# Patient Record
Sex: FEMALE | Race: WHITE | NOT HISPANIC OR LATINO | Employment: PART TIME | URBAN - METROPOLITAN AREA
[De-identification: names, ages, dates, MRNs, and addresses within clinical notes are randomized per-mention and may not be internally consistent; named-entity substitution may affect disease eponyms.]

---

## 2023-02-23 LAB
ANION GAP IN SER/PLAS: 11 MMOL/L (ref 10–20)
CALCIUM (MG/DL) IN SER/PLAS: 9 MG/DL (ref 8.6–10.3)
CARBON DIOXIDE, TOTAL (MMOL/L) IN SER/PLAS: 32 MMOL/L (ref 21–32)
CHLORIDE (MMOL/L) IN SER/PLAS: 100 MMOL/L (ref 98–107)
CREATININE (MG/DL) IN SER/PLAS: 1.35 MG/DL (ref 0.5–1.05)
GFR FEMALE: 43 ML/MIN/1.73M2
GLUCOSE (MG/DL) IN SER/PLAS: 100 MG/DL (ref 74–99)
POTASSIUM (MMOL/L) IN SER/PLAS: 3.5 MMOL/L (ref 3.5–5.3)
SODIUM (MMOL/L) IN SER/PLAS: 139 MMOL/L (ref 136–145)
UREA NITROGEN (MG/DL) IN SER/PLAS: 24 MG/DL (ref 6–23)

## 2023-02-24 LAB
ESTIMATED AVERAGE GLUCOSE FOR HBA1C: 131 MG/DL
HEMOGLOBIN A1C/HEMOGLOBIN TOTAL IN BLOOD: 6.2 %

## 2023-04-27 ENCOUNTER — APPOINTMENT (OUTPATIENT)
Dept: PRIMARY CARE | Facility: CLINIC | Age: 68
End: 2023-04-27
Payer: MEDICARE

## 2023-05-30 DIAGNOSIS — I10 PRIMARY HYPERTENSION: ICD-10-CM

## 2023-05-30 DIAGNOSIS — E11.9 TYPE 2 DIABETES MELLITUS WITHOUT COMPLICATION, UNSPECIFIED WHETHER LONG TERM INSULIN USE (MULTI): ICD-10-CM

## 2023-05-30 RX ORDER — ATORVASTATIN CALCIUM 40 MG/1
40 TABLET, FILM COATED ORAL DAILY
COMMUNITY
End: 2023-05-30 | Stop reason: SDUPTHER

## 2023-05-30 RX ORDER — LISINOPRIL AND HYDROCHLOROTHIAZIDE 12.5; 2 MG/1; MG/1
2 TABLET ORAL DAILY
COMMUNITY
End: 2023-05-30 | Stop reason: SDUPTHER

## 2023-06-01 RX ORDER — ATORVASTATIN CALCIUM 40 MG/1
40 TABLET, FILM COATED ORAL DAILY
Qty: 30 TABLET | Refills: 0 | Status: SHIPPED | OUTPATIENT
Start: 2023-06-01 | End: 2024-01-12

## 2023-06-01 RX ORDER — LISINOPRIL AND HYDROCHLOROTHIAZIDE 12.5; 2 MG/1; MG/1
2 TABLET ORAL DAILY
Qty: 30 TABLET | Refills: 0 | Status: SHIPPED | OUTPATIENT
Start: 2023-06-01 | End: 2024-01-12

## 2023-08-31 ENCOUNTER — APPOINTMENT (OUTPATIENT)
Dept: PRIMARY CARE | Facility: CLINIC | Age: 68
End: 2023-08-31
Payer: MEDICARE

## 2023-09-14 PROBLEM — J44.9 CHRONIC OBSTRUCTIVE PULMONARY DISEASE (MULTI): Status: ACTIVE | Noted: 2023-09-14

## 2023-09-14 PROBLEM — I10 ESSENTIAL HYPERTENSION: Status: ACTIVE | Noted: 2023-09-14

## 2023-09-14 PROBLEM — G47.33 OBSTRUCTIVE SLEEP APNEA SYNDROME: Status: ACTIVE | Noted: 2023-09-14

## 2023-09-14 PROBLEM — I10 HYPERTENSION, BENIGN: Status: ACTIVE | Noted: 2023-09-14

## 2023-09-14 PROBLEM — I82.90 VENOUS THROMBOSIS: Status: ACTIVE | Noted: 2023-09-14

## 2023-09-14 PROBLEM — D75.1 SECONDARY POLYCYTHEMIA: Status: ACTIVE | Noted: 2023-09-14

## 2023-09-14 PROBLEM — Z85.41 HX OF CERVICAL CANCER: Status: ACTIVE | Noted: 2023-09-14

## 2023-09-14 PROBLEM — E55.9 VITAMIN D DEFICIENCY: Status: ACTIVE | Noted: 2023-09-14

## 2023-09-14 PROBLEM — I50.30: Status: ACTIVE | Noted: 2023-09-14

## 2023-09-14 PROBLEM — J43.9 PULMONARY EMPHYSEMA (MULTI): Status: ACTIVE | Noted: 2023-09-14

## 2023-09-14 PROBLEM — N95.1 MENOPAUSAL STATE: Status: ACTIVE | Noted: 2023-09-14

## 2023-09-14 PROBLEM — I50.9 HEART FAILURE (MULTI): Status: ACTIVE | Noted: 2023-09-14

## 2023-09-14 PROBLEM — F17.200 TOBACCO DEPENDENCE SYNDROME: Status: ACTIVE | Noted: 2023-09-14

## 2023-09-14 PROBLEM — E87.6 HYPOKALEMIA: Status: ACTIVE | Noted: 2023-09-14

## 2023-09-14 PROBLEM — F17.200 CURRENT SMOKER: Status: ACTIVE | Noted: 2023-09-14

## 2023-09-14 PROBLEM — E11.9 DIABETES MELLITUS, TYPE II (MULTI): Status: ACTIVE | Noted: 2023-09-14

## 2023-09-14 PROBLEM — J96.11 CHRONIC RESPIRATORY FAILURE WITH HYPOXIA (MULTI): Status: ACTIVE | Noted: 2023-09-14

## 2023-09-14 PROBLEM — R06.89 IMPAIRED GAS EXCHANGE: Status: ACTIVE | Noted: 2023-09-14

## 2023-09-14 PROBLEM — R18.8 ASCITES: Status: ACTIVE | Noted: 2023-09-14

## 2023-09-14 PROBLEM — E78.5 HYPERLIPIDEMIA, UNSPECIFIED: Status: ACTIVE | Noted: 2023-09-14

## 2023-09-14 PROBLEM — E87.8 FLUID VOLUME DISORDER: Status: ACTIVE | Noted: 2023-09-14

## 2023-09-14 RX ORDER — MICONAZOLE NITRATE 2 %
CREAM (GRAM) TOPICAL
COMMUNITY
End: 2024-03-12 | Stop reason: ALTCHOICE

## 2023-09-14 RX ORDER — ALBUTEROL SULFATE 90 UG/1
AEROSOL, METERED RESPIRATORY (INHALATION)
COMMUNITY

## 2023-09-14 RX ORDER — FLUTICASONE PROPIONATE AND SALMETEROL 250; 50 UG/1; UG/1
POWDER RESPIRATORY (INHALATION)
COMMUNITY
Start: 2019-04-16 | End: 2024-03-12 | Stop reason: ALTCHOICE

## 2023-09-14 RX ORDER — FUROSEMIDE 40 MG/1
TABLET ORAL
COMMUNITY
End: 2024-03-12 | Stop reason: ALTCHOICE

## 2023-09-14 RX ORDER — POTASSIUM CHLORIDE 750 MG/1
TABLET, EXTENDED RELEASE ORAL
COMMUNITY
Start: 2021-10-19 | End: 2024-01-12

## 2023-09-14 RX ORDER — METFORMIN HYDROCHLORIDE 500 MG/1
TABLET, EXTENDED RELEASE ORAL
COMMUNITY
Start: 2021-07-27 | End: 2024-03-12 | Stop reason: ALTCHOICE

## 2023-09-14 RX ORDER — TIOTROPIUM BROMIDE INHALATION SPRAY 3.12 UG/1
SPRAY, METERED RESPIRATORY (INHALATION)
COMMUNITY
Start: 2019-05-13 | End: 2024-03-12 | Stop reason: ALTCHOICE

## 2023-09-14 RX ORDER — ERGOCALCIFEROL 1.25 MG/1
1 CAPSULE ORAL
COMMUNITY
Start: 2022-01-27 | End: 2024-03-12 | Stop reason: ALTCHOICE

## 2023-09-14 RX ORDER — LISINOPRIL 10 MG/1
TABLET ORAL
COMMUNITY
End: 2024-03-12 | Stop reason: ALTCHOICE

## 2023-09-14 RX ORDER — ALBUTEROL SULFATE 90 UG/1
AEROSOL, METERED RESPIRATORY (INHALATION)
COMMUNITY
Start: 2018-07-16

## 2023-09-14 RX ORDER — FLUTICASONE FUROATE AND VILANTEROL 200; 25 UG/1; UG/1
POWDER RESPIRATORY (INHALATION)
COMMUNITY
End: 2024-03-12 | Stop reason: ALTCHOICE

## 2023-09-14 RX ORDER — FLUTICASONE FUROATE, UMECLIDINIUM BROMIDE AND VILANTEROL TRIFENATATE 100; 62.5; 25 UG/1; UG/1; UG/1
POWDER RESPIRATORY (INHALATION)
COMMUNITY

## 2023-09-30 ENCOUNTER — APPOINTMENT (OUTPATIENT)
Dept: LAB | Facility: LAB | Age: 68
End: 2023-09-30
Payer: MEDICARE

## 2023-09-30 PROCEDURE — 80061 LIPID PANEL: CPT

## 2023-09-30 PROCEDURE — 80048 BASIC METABOLIC PNL TOTAL CA: CPT

## 2023-09-30 PROCEDURE — 82043 UR ALBUMIN QUANTITATIVE: CPT

## 2023-09-30 PROCEDURE — 82570 ASSAY OF URINE CREATININE: CPT

## 2023-09-30 PROCEDURE — 83036 HEMOGLOBIN GLYCOSYLATED A1C: CPT

## 2023-09-30 PROCEDURE — 84443 ASSAY THYROID STIM HORMONE: CPT

## 2023-09-30 PROCEDURE — 82607 VITAMIN B-12: CPT

## 2024-01-11 DIAGNOSIS — I10 PRIMARY HYPERTENSION: ICD-10-CM

## 2024-01-11 DIAGNOSIS — E11.9 TYPE 2 DIABETES MELLITUS WITHOUT COMPLICATION, UNSPECIFIED WHETHER LONG TERM INSULIN USE (MULTI): ICD-10-CM

## 2024-01-12 RX ORDER — POTASSIUM CHLORIDE 750 MG/1
10 TABLET, EXTENDED RELEASE ORAL DAILY
Qty: 90 TABLET | Refills: 0 | Status: SHIPPED | OUTPATIENT
Start: 2024-01-12 | End: 2024-04-18

## 2024-01-12 RX ORDER — LISINOPRIL AND HYDROCHLOROTHIAZIDE 12.5; 2 MG/1; MG/1
1 TABLET ORAL DAILY
Qty: 90 TABLET | Refills: 0 | Status: SHIPPED | OUTPATIENT
Start: 2024-01-12 | End: 2024-03-12 | Stop reason: SDUPTHER

## 2024-01-12 RX ORDER — ATORVASTATIN CALCIUM 40 MG/1
40 TABLET, FILM COATED ORAL DAILY
Qty: 90 TABLET | Refills: 0 | Status: SHIPPED | OUTPATIENT
Start: 2024-01-12 | End: 2024-04-18

## 2024-01-12 NOTE — TELEPHONE ENCOUNTER
Called the pt and he stated he is taking 1 tablet a day of lisinopril-hydrochlorothiazide, med updated

## 2024-01-13 DIAGNOSIS — E11.9 TYPE 2 DIABETES MELLITUS WITHOUT COMPLICATION, WITHOUT LONG-TERM CURRENT USE OF INSULIN (MULTI): Primary | ICD-10-CM

## 2024-01-15 RX ORDER — DAPAGLIFLOZIN 10 MG/1
10 TABLET, FILM COATED ORAL DAILY
Qty: 90 TABLET | Refills: 1 | Status: SHIPPED | OUTPATIENT
Start: 2024-01-15 | End: 2024-03-12 | Stop reason: SDUPTHER

## 2024-03-04 ENCOUNTER — HOSPITAL ENCOUNTER (OUTPATIENT)
Dept: RADIOLOGY | Facility: HOSPITAL | Age: 69
Discharge: HOME | End: 2024-03-04
Payer: MEDICARE

## 2024-03-04 DIAGNOSIS — F17.200 NICOTINE DEPENDENCE, UNSPECIFIED, UNCOMPLICATED: ICD-10-CM

## 2024-03-04 PROCEDURE — 71271 CT THORAX LUNG CANCER SCR C-: CPT

## 2024-03-04 PROCEDURE — 71271 CT THORAX LUNG CANCER SCR C-: CPT | Performed by: RADIOLOGY

## 2024-03-12 ENCOUNTER — OFFICE VISIT (OUTPATIENT)
Dept: PRIMARY CARE | Facility: CLINIC | Age: 69
End: 2024-03-12
Payer: MEDICARE

## 2024-03-12 VITALS
BODY MASS INDEX: 37.5 KG/M2 | WEIGHT: 186 LBS | DIASTOLIC BLOOD PRESSURE: 78 MMHG | SYSTOLIC BLOOD PRESSURE: 126 MMHG | HEIGHT: 59 IN | OXYGEN SATURATION: 91 % | HEART RATE: 80 BPM

## 2024-03-12 DIAGNOSIS — N18.32 STAGE 3B CHRONIC KIDNEY DISEASE (MULTI): ICD-10-CM

## 2024-03-12 DIAGNOSIS — L82.0 SEBORRHEIC KERATOSES, INFLAMED: ICD-10-CM

## 2024-03-12 DIAGNOSIS — E55.9 VITAMIN D DEFICIENCY: ICD-10-CM

## 2024-03-12 DIAGNOSIS — I10 PRIMARY HYPERTENSION: ICD-10-CM

## 2024-03-12 DIAGNOSIS — E53.8 VITAMIN B12 DEFICIENCY: ICD-10-CM

## 2024-03-12 DIAGNOSIS — Z11.59 ENCOUNTER FOR HEPATITIS C SCREENING TEST FOR LOW RISK PATIENT: ICD-10-CM

## 2024-03-12 DIAGNOSIS — N18.32 TYPE 2 DIABETES MELLITUS WITH STAGE 3B CHRONIC KIDNEY DISEASE, WITHOUT LONG-TERM CURRENT USE OF INSULIN (MULTI): Primary | ICD-10-CM

## 2024-03-12 DIAGNOSIS — E11.22 TYPE 2 DIABETES MELLITUS WITH STAGE 3B CHRONIC KIDNEY DISEASE, WITHOUT LONG-TERM CURRENT USE OF INSULIN (MULTI): Primary | ICD-10-CM

## 2024-03-12 PROCEDURE — 99214 OFFICE O/P EST MOD 30 MIN: CPT

## 2024-03-12 PROCEDURE — 90750 HZV VACC RECOMBINANT IM: CPT

## 2024-03-12 PROCEDURE — 1126F AMNT PAIN NOTED NONE PRSNT: CPT

## 2024-03-12 PROCEDURE — 3074F SYST BP LT 130 MM HG: CPT

## 2024-03-12 PROCEDURE — 3078F DIAST BP <80 MM HG: CPT

## 2024-03-12 RX ORDER — LISINOPRIL AND HYDROCHLOROTHIAZIDE 12.5; 2 MG/1; MG/1
1 TABLET ORAL 2 TIMES DAILY
Qty: 180 TABLET | Refills: 1 | Status: SHIPPED | OUTPATIENT
Start: 2024-03-12

## 2024-03-12 RX ORDER — DAPAGLIFLOZIN 10 MG/1
10 TABLET, FILM COATED ORAL DAILY
Qty: 90 TABLET | Refills: 1 | Status: SHIPPED | OUTPATIENT
Start: 2024-03-12

## 2024-03-12 ASSESSMENT — ENCOUNTER SYMPTOMS
POLYPHAGIA: 0
DIZZINESS: 0
SHORTNESS OF BREATH: 1
FATIGUE: 0
LIGHT-HEADEDNESS: 0
COUGH: 1
POLYDIPSIA: 0
PALPITATIONS: 0

## 2024-03-12 ASSESSMENT — PATIENT HEALTH QUESTIONNAIRE - PHQ9
SUM OF ALL RESPONSES TO PHQ9 QUESTIONS 1 AND 2: 0
2. FEELING DOWN, DEPRESSED OR HOPELESS: NOT AT ALL
1. LITTLE INTEREST OR PLEASURE IN DOING THINGS: NOT AT ALL

## 2024-03-12 ASSESSMENT — PAIN SCALES - GENERAL: PAINLEVEL: 0-NO PAIN

## 2024-03-12 NOTE — PROGRESS NOTES
"Subjective   Patient ID: Leroy Dejesus is a 68 y.o. female who presents for Follow-up.    68yF with hx COPD, HTN, HLD, NIDDM, tobacco use, hx cervical cancer (s/p cone procedure), CHF, lipoma on right cheek.    Other providers: Dr. Youngblood (pulmonologist), Melanie Vincent CNP (previous PCP).    HTN: Controlled, on Lisinopril-HCTZ 20mg -12.5 mg BID. Patient has not been recently checking home BP's. +occasional leg edema when warm outside. Denies chest pain, heart palpitations, changes in vision, headaches, syncope.    NIDDM: last A1c was 6.6 on 23. On 10 mg Farziga with co-morbid CKD. Stopped Metformin due to diarrhea, started Farxiga due to comorbid CKD. Checks daily blood sugars, usually in 130's. Retinal exam in , plans to call and see if can find records. +Statin, +ACE. Last foot exam  2023. PNA UTD.    CKD stage 3b: started on 10 mg Farziga. Last GFR 2023 was 38.   HLD: stable on 40 mg of Atorvastatin. Last lipid 2023.     Medicare annuals done through home service, had one 2023.       Review of Systems   Constitutional:  Negative for fatigue.   Eyes:  Negative for visual disturbance.   Respiratory:  Positive for cough and shortness of breath.    Cardiovascular:  Negative for chest pain, palpitations and leg swelling.   Endocrine: Negative for polydipsia, polyphagia and polyuria.   Neurological:  Negative for dizziness, syncope and light-headedness.       Objective   /78   Pulse 80   Ht 1.499 m (4' 11\")   Wt 84.4 kg (186 lb)   SpO2 91%   BMI 37.57 kg/m²     Physical Exam  Constitutional:       General: She is not in acute distress.     Appearance: Normal appearance.   Cardiovascular:      Rate and Rhythm: Normal rate and regular rhythm.      Heart sounds: No murmur heard.  Pulmonary:      Effort: Pulmonary effort is normal.      Breath sounds: Normal breath sounds. No wheezing, rhonchi or rales.   Musculoskeletal:      Right lower le+ Edema present.      Left lower le+ " Edema present.   Skin:     General: Skin is warm and dry.      Findings: No rash.      Comments: Large SK on left lateral neck   Neurological:      Mental Status: She is alert.   Psychiatric:         Mood and Affect: Mood and affect normal.         Assessment/Plan   Diagnoses and all orders for this visit:  Type 2 diabetes mellitus with stage 3b chronic kidney disease, without long-term current use of insulin (CMS/HCC)  -     dapagliflozin propanediol (Farxiga) 10 mg; Take 1 tablet (10 mg) by mouth once daily.  -     Hemoglobin A1c; Future  -     Comprehensive metabolic panel; Future  Primary hypertension  -     lisinopriL-hydrochlorothiazide 20-12.5 mg tablet; Take 1 tablet by mouth 2 times a day.  Stage 3b chronic kidney disease (CMS/HCC)  -     Comprehensive metabolic panel; Future  Seborrheic keratoses, inflamed  Vitamin D deficiency  -     Vitamin D 25-Hydroxy,Total (for eval of Vitamin D levels); Future  Vitamin B12 deficiency  -     Vitamin B12; Future  Encounter for hepatitis C screening test for low risk patient  -     Hepatitis C Antibody; Future  Other orders  -     Zoster vaccine, recombinant, adult (SHINGRIX)  Follow-up 6 months, and schedule procedure for SK removal.

## 2024-03-13 ENCOUNTER — LAB (OUTPATIENT)
Dept: LAB | Facility: LAB | Age: 69
End: 2024-03-13
Payer: MEDICARE

## 2024-03-13 DIAGNOSIS — N18.32 TYPE 2 DIABETES MELLITUS WITH STAGE 3B CHRONIC KIDNEY DISEASE, WITHOUT LONG-TERM CURRENT USE OF INSULIN (MULTI): ICD-10-CM

## 2024-03-13 DIAGNOSIS — Z11.59 ENCOUNTER FOR HEPATITIS C SCREENING TEST FOR LOW RISK PATIENT: ICD-10-CM

## 2024-03-13 DIAGNOSIS — E55.9 VITAMIN D DEFICIENCY: ICD-10-CM

## 2024-03-13 DIAGNOSIS — N18.32 STAGE 3B CHRONIC KIDNEY DISEASE (MULTI): ICD-10-CM

## 2024-03-13 DIAGNOSIS — E11.22 TYPE 2 DIABETES MELLITUS WITH STAGE 3B CHRONIC KIDNEY DISEASE, WITHOUT LONG-TERM CURRENT USE OF INSULIN (MULTI): ICD-10-CM

## 2024-03-13 DIAGNOSIS — E53.8 VITAMIN B12 DEFICIENCY: ICD-10-CM

## 2024-03-13 LAB
25(OH)D3 SERPL-MCNC: 19 NG/ML (ref 31–100)
ALBUMIN SERPL-MCNC: 4 G/DL (ref 3.5–5)
ALP BLD-CCNC: 76 U/L (ref 35–125)
ALT SERPL-CCNC: 8 U/L (ref 5–40)
ANION GAP SERPL CALC-SCNC: 10 MMOL/L
AST SERPL-CCNC: 12 U/L (ref 5–40)
BILIRUB SERPL-MCNC: 0.3 MG/DL (ref 0.1–1.2)
BUN SERPL-MCNC: 12 MG/DL (ref 8–25)
CALCIUM SERPL-MCNC: 9.4 MG/DL (ref 8.5–10.4)
CHLORIDE SERPL-SCNC: 103 MMOL/L (ref 97–107)
CO2 SERPL-SCNC: 31 MMOL/L (ref 24–31)
CREAT SERPL-MCNC: 1.2 MG/DL (ref 0.4–1.6)
EGFRCR SERPLBLD CKD-EPI 2021: 49 ML/MIN/1.73M*2
EST. AVERAGE GLUCOSE BLD GHB EST-MCNC: 154 MG/DL
GLUCOSE SERPL-MCNC: 100 MG/DL (ref 65–99)
HBA1C MFR BLD: 7 %
HCV AB SER QL: NONREACTIVE
POTASSIUM SERPL-SCNC: 4.4 MMOL/L (ref 3.4–5.1)
PROT SERPL-MCNC: 6.4 G/DL (ref 5.9–7.9)
SODIUM SERPL-SCNC: 144 MMOL/L (ref 133–145)
VIT B12 SERPL-MCNC: 333 PG/ML (ref 211–946)

## 2024-03-13 PROCEDURE — 86803 HEPATITIS C AB TEST: CPT

## 2024-03-13 PROCEDURE — 82306 VITAMIN D 25 HYDROXY: CPT

## 2024-03-13 PROCEDURE — 36415 COLL VENOUS BLD VENIPUNCTURE: CPT

## 2024-03-13 PROCEDURE — 82607 VITAMIN B-12: CPT

## 2024-03-13 PROCEDURE — 80053 COMPREHEN METABOLIC PANEL: CPT

## 2024-03-13 PROCEDURE — 83036 HEMOGLOBIN GLYCOSYLATED A1C: CPT

## 2024-03-26 ENCOUNTER — PROCEDURE VISIT (OUTPATIENT)
Dept: PRIMARY CARE | Facility: CLINIC | Age: 69
End: 2024-03-26
Payer: MEDICARE

## 2024-03-26 VITALS
BODY MASS INDEX: 37.5 KG/M2 | HEART RATE: 69 BPM | HEIGHT: 59 IN | WEIGHT: 186 LBS | SYSTOLIC BLOOD PRESSURE: 126 MMHG | DIASTOLIC BLOOD PRESSURE: 74 MMHG | OXYGEN SATURATION: 97 %

## 2024-03-26 DIAGNOSIS — L98.9 SKIN LESION: Primary | ICD-10-CM

## 2024-03-26 DIAGNOSIS — L82.0 SEBORRHEIC KERATOSES, INFLAMED: ICD-10-CM

## 2024-03-26 PROCEDURE — 88305 TISSUE EXAM BY PATHOLOGIST: CPT | Performed by: PATHOLOGY

## 2024-03-26 PROCEDURE — 11441 EXC FACE-MM B9+MARG 0.6-1 CM: CPT

## 2024-03-26 PROCEDURE — 88305 TISSUE EXAM BY PATHOLOGIST: CPT | Mod: TC

## 2024-03-26 ASSESSMENT — PAIN SCALES - GENERAL: PAINLEVEL: 0-NO PAIN

## 2024-03-26 ASSESSMENT — ENCOUNTER SYMPTOMS
FEVER: 0
CHILLS: 0

## 2024-03-26 NOTE — PROGRESS NOTES
"Subjective   Patient ID: Leroy Dejesus is a 68 y.o. female who presents for mole removal.    68yF with hx COPD, HTN, HLD, NIDDM, tobacco use, hx cervical cancer (s/p cone procedure), CHF, lipoma on right cheek.    Other providers: Dr. Youngblood (pulmonologist), Melanie Vincent CNP (previous PCP).    Here for SK removal located left lateral neck/upper shoulder.          Review of Systems   Constitutional:  Negative for chills and fever.   Skin:  Positive for rash.       Objective   /74   Pulse 69   Ht 1.499 m (4' 11\")   Wt 84.4 kg (186 lb)   SpO2 97%   BMI 37.57 kg/m²     Physical Exam  Constitutional:       Appearance: Normal appearance.   Neck:      Comments: Large SK on left lateral neck  Cardiovascular:      Rate and Rhythm: Normal rate.   Skin:     Findings: No rash.   Neurological:      Mental Status: She is alert.   Psychiatric:         Mood and Affect: Mood and affect normal.       Patient ID: Leroy Dejesus is a 68 y.o. female.    Incisional Biopsy    Date/Time: 3/26/2024 4:35 PM    Performed by: Fabienne Styles PA-C  Authorized by: Fabienne Styles PA-C    Consent:     Consent obtained:  Verbal and written    Consent given by:  Patient    Risks, benefits, and alternatives were discussed: yes      Risks discussed:  Bleeding and infection  Universal protocol:     Procedure explained and questions answered to patient or proxy's satisfaction: yes      Site/side marked: yes      Immediately prior to procedure, a time out was called: yes      Patient identity confirmed:  Verbally with patient  Indications:     Indications:  Irritated SK  Pre-procedure details:     Skin preparation:  Povidone-iodine  Sedation:     Sedation type:  None  Anesthesia:     Anesthesia method:  Local infiltration    Local anesthetic:  Lidocaine 1% w/o epi  Post-procedure details:     Procedure completion:  Tolerated well, no immediate complications      Assessment/Plan   Diagnoses and all orders for this visit:  Skin " lesion  -     Surgical Pathology Exam  -     Incisional Biopsy  Seborrheic keratoses, inflamed  -     Incisional Biopsy

## 2024-03-29 ENCOUNTER — TELEPHONE (OUTPATIENT)
Dept: PRIMARY CARE | Facility: CLINIC | Age: 69
End: 2024-03-29
Payer: MEDICARE

## 2024-03-29 LAB
LABORATORY COMMENT REPORT: NORMAL
PATH REPORT.FINAL DX SPEC: NORMAL
PATH REPORT.GROSS SPEC: NORMAL
PATH REPORT.RELEVANT HX SPEC: NORMAL
PATH REPORT.TOTAL CANCER: NORMAL

## 2024-04-18 DIAGNOSIS — I10 PRIMARY HYPERTENSION: ICD-10-CM

## 2024-04-18 DIAGNOSIS — E11.9 TYPE 2 DIABETES MELLITUS WITHOUT COMPLICATION, UNSPECIFIED WHETHER LONG TERM INSULIN USE (MULTI): ICD-10-CM

## 2024-04-18 RX ORDER — POTASSIUM CHLORIDE 750 MG/1
10 TABLET, EXTENDED RELEASE ORAL DAILY
Qty: 90 TABLET | Refills: 1 | Status: SHIPPED | OUTPATIENT
Start: 2024-04-18

## 2024-04-18 RX ORDER — ATORVASTATIN CALCIUM 40 MG/1
40 TABLET, FILM COATED ORAL DAILY
Qty: 90 TABLET | Refills: 1 | Status: SHIPPED | OUTPATIENT
Start: 2024-04-18

## 2024-07-13 DIAGNOSIS — I10 PRIMARY HYPERTENSION: ICD-10-CM

## 2024-07-15 RX ORDER — LISINOPRIL AND HYDROCHLOROTHIAZIDE 12.5; 2 MG/1; MG/1
1 TABLET ORAL 2 TIMES DAILY
Qty: 180 TABLET | Refills: 1 | Status: SHIPPED | OUTPATIENT
Start: 2024-07-15

## 2024-09-05 ENCOUNTER — OFFICE VISIT (OUTPATIENT)
Dept: PRIMARY CARE | Facility: CLINIC | Age: 69
End: 2024-09-05
Payer: MEDICARE

## 2024-09-05 VITALS
OXYGEN SATURATION: 96 % | SYSTOLIC BLOOD PRESSURE: 138 MMHG | HEART RATE: 78 BPM | WEIGHT: 183 LBS | HEIGHT: 59 IN | DIASTOLIC BLOOD PRESSURE: 78 MMHG | BODY MASS INDEX: 36.89 KG/M2

## 2024-09-05 DIAGNOSIS — E66.01 OBESITY, MORBID (MULTI): ICD-10-CM

## 2024-09-05 DIAGNOSIS — N18.31 TYPE 2 DIABETES MELLITUS WITH STAGE 3A CHRONIC KIDNEY DISEASE, WITHOUT LONG-TERM CURRENT USE OF INSULIN (MULTI): Primary | ICD-10-CM

## 2024-09-05 DIAGNOSIS — J44.9 CHRONIC OBSTRUCTIVE PULMONARY DISEASE, UNSPECIFIED COPD TYPE (MULTI): ICD-10-CM

## 2024-09-05 DIAGNOSIS — E78.2 MIXED HYPERLIPIDEMIA: ICD-10-CM

## 2024-09-05 DIAGNOSIS — I50.30 DIASTOLIC HEART FAILURE OF UNKNOWN ETIOLOGY (MULTI): ICD-10-CM

## 2024-09-05 DIAGNOSIS — E11.22 TYPE 2 DIABETES MELLITUS WITH STAGE 3A CHRONIC KIDNEY DISEASE, WITHOUT LONG-TERM CURRENT USE OF INSULIN (MULTI): Primary | ICD-10-CM

## 2024-09-05 DIAGNOSIS — I10 PRIMARY HYPERTENSION: ICD-10-CM

## 2024-09-05 PROBLEM — E87.8 FLUID VOLUME DISORDER: Status: RESOLVED | Noted: 2023-09-14 | Resolved: 2024-09-05

## 2024-09-05 PROBLEM — F17.200 CURRENT SMOKER: Status: RESOLVED | Noted: 2023-09-14 | Resolved: 2024-09-05

## 2024-09-05 PROBLEM — E11.9 DIABETES MELLITUS, TYPE II (MULTI): Status: RESOLVED | Noted: 2023-09-14 | Resolved: 2024-09-05

## 2024-09-05 PROBLEM — J96.11 CHRONIC RESPIRATORY FAILURE WITH HYPOXIA (MULTI): Status: RESOLVED | Noted: 2023-09-14 | Resolved: 2024-09-05

## 2024-09-05 PROBLEM — R06.89 IMPAIRED GAS EXCHANGE: Status: RESOLVED | Noted: 2023-09-14 | Resolved: 2024-09-05

## 2024-09-05 PROBLEM — E10.22 TYPE 1 DIABETES MELLITUS WITH STAGE 3A CHRONIC KIDNEY DISEASE (MULTI): Status: ACTIVE | Noted: 2023-05-30

## 2024-09-05 PROBLEM — I50.9 HEART FAILURE (MULTI): Status: RESOLVED | Noted: 2023-09-14 | Resolved: 2024-09-05

## 2024-09-05 PROBLEM — R18.8 ASCITES: Status: RESOLVED | Noted: 2023-09-14 | Resolved: 2024-09-05

## 2024-09-05 PROCEDURE — 99214 OFFICE O/P EST MOD 30 MIN: CPT

## 2024-09-05 PROCEDURE — 3051F HG A1C>EQUAL 7.0%<8.0%: CPT

## 2024-09-05 PROCEDURE — 1124F ACP DISCUSS-NO DSCNMKR DOCD: CPT

## 2024-09-05 PROCEDURE — 3008F BODY MASS INDEX DOCD: CPT

## 2024-09-05 PROCEDURE — 3075F SYST BP GE 130 - 139MM HG: CPT

## 2024-09-05 PROCEDURE — 3078F DIAST BP <80 MM HG: CPT

## 2024-09-05 PROCEDURE — 1126F AMNT PAIN NOTED NONE PRSNT: CPT

## 2024-09-05 RX ORDER — LISINOPRIL AND HYDROCHLOROTHIAZIDE 12.5; 2 MG/1; MG/1
1 TABLET ORAL 2 TIMES DAILY
Qty: 180 TABLET | Refills: 1 | Status: SHIPPED | OUTPATIENT
Start: 2024-09-05 | End: 2024-09-05

## 2024-09-05 RX ORDER — POTASSIUM CHLORIDE 750 MG/1
10 TABLET, FILM COATED, EXTENDED RELEASE ORAL DAILY
Qty: 90 TABLET | Refills: 1 | Status: SHIPPED | OUTPATIENT
Start: 2024-09-05

## 2024-09-05 RX ORDER — DAPAGLIFLOZIN 10 MG/1
10 TABLET, FILM COATED ORAL DAILY
Qty: 30 TABLET | Refills: 5 | Status: SHIPPED | OUTPATIENT
Start: 2024-09-05

## 2024-09-05 RX ORDER — LISINOPRIL AND HYDROCHLOROTHIAZIDE 12.5; 2 MG/1; MG/1
2 TABLET ORAL DAILY
Qty: 180 TABLET | Refills: 1 | Status: SHIPPED | OUTPATIENT
Start: 2024-09-05

## 2024-09-05 RX ORDER — ATORVASTATIN CALCIUM 40 MG/1
40 TABLET, FILM COATED ORAL DAILY
Qty: 90 TABLET | Refills: 1 | Status: SHIPPED | OUTPATIENT
Start: 2024-09-05

## 2024-09-05 ASSESSMENT — ENCOUNTER SYMPTOMS
DIZZINESS: 0
FATIGUE: 0
SHORTNESS OF BREATH: 0
LIGHT-HEADEDNESS: 0
PALPITATIONS: 0

## 2024-09-05 ASSESSMENT — PATIENT HEALTH QUESTIONNAIRE - PHQ9
1. LITTLE INTEREST OR PLEASURE IN DOING THINGS: NOT AT ALL
SUM OF ALL RESPONSES TO PHQ9 QUESTIONS 1 AND 2: 0
2. FEELING DOWN, DEPRESSED OR HOPELESS: NOT AT ALL

## 2024-09-05 ASSESSMENT — PAIN SCALES - GENERAL: PAINLEVEL: 0-NO PAIN

## 2024-09-05 NOTE — PROGRESS NOTES
"Subjective   Patient ID: Leroy Dejesus is a 68 y.o. female who presents for Diabetes (Follow up /la).    hx COPD, HTN, HLD, NIDDM, tobacco use, hx cervical cancer (s/p cone procedure), CHF, lipoma on right cheek.    Other providers: Dr. Youngblood (pulmonologist), Melanie Vincent CNP (previous PCP).    HTN: Controlled,takes two Lisinopril-HCTZ 20mg -12.5 mg. Patient has not been recently checking home BP's. +occasional leg edema when warm outside. Denies chest pain, heart palpitations, changes in vision, headaches, syncope.    NIDDM: last A1c was 7.0 (3/13/14). On 10 mg Farziga with co-morbid CKD. Stopped Metformin due to diarrhea, started Farxiga due to comorbid CKD. Checks daily blood sugars, usually in 130's. Last foot exam UTD 9/5/24. Eye exam due, plans to schedule. GFR 49 (3/13/14). +Statin, +ACE. PNA UTD.    CKD stage 3a: started on 10 mg Farziga. Last GFR 3/2024 improved to 49.   HLD: stable on 40 mg of Atorvastatin. Last lipid 9/2023, new order placed.          Review of Systems   Constitutional:  Negative for fatigue.   Eyes:  Negative for visual disturbance.   Respiratory:  Negative for shortness of breath.    Cardiovascular:  Positive for leg swelling. Negative for chest pain and palpitations.   Neurological:  Negative for dizziness, syncope and light-headedness.       Objective   /78   Pulse 78   Ht 1.499 m (4' 11\")   Wt 83 kg (183 lb)   SpO2 96%   BMI 36.96 kg/m²     Physical Exam  Constitutional:       General: She is not in acute distress.     Appearance: Normal appearance.   HENT:      Right Ear: A middle ear effusion is present.      Left Ear: There is impacted cerumen.   Cardiovascular:      Rate and Rhythm: Normal rate and regular rhythm.      Pulses:           Dorsalis pedis pulses are 2+ on the right side and 2+ on the left side.        Posterior tibial pulses are 2+ on the right side and 2+ on the left side.      Heart sounds: No murmur heard.  Pulmonary:      Effort: Pulmonary effort " is normal.      Breath sounds: Normal breath sounds. No wheezing, rhonchi or rales.   Musculoskeletal:      Right lower le+ Pitting Edema present.      Left lower le+ Pitting Edema present.   Feet:      Right foot:      Protective Sensation: 5 sites tested.  5 sites sensed.      Skin integrity: Skin integrity normal.      Left foot:      Protective Sensation: 5 sites tested.  5 sites sensed.      Skin integrity: Skin integrity normal.   Skin:     General: Skin is warm and dry.      Findings: No rash.   Neurological:      Mental Status: She is alert.   Psychiatric:         Mood and Affect: Mood and affect normal.         Assessment/Plan   Diagnoses and all orders for this visit:  Type 2 diabetes mellitus with stage 3a chronic kidney disease, without long-term current use of insulin (Multi)  -     Basic metabolic panel; Future  -     Hemoglobin A1c; Future  -     Albumin-Creatinine Ratio, Urine Random; Future  -     dapagliflozin propanediol (Farxiga) 10 mg; Take 1 tablet (10 mg) by mouth once daily.  Primary hypertension  -     potassium chloride CR 10 mEq ER tablet; Take 1 tablet (10 mEq) by mouth once daily. Take with food.  -     lisinopriL-hydrochlorothiazide 20-12.5 mg tablet; Take 2 tablets by mouth once daily.  Mixed hyperlipidemia  -     Lipid panel; Future  -     atorvastatin (Lipitor) 40 mg tablet; Take 1 tablet (40 mg) by mouth once daily.  Obesity, morbid (Multi)  Chronic obstructive pulmonary disease, unspecified COPD type (Multi)  Diastolic heart failure of unknown etiology (Multi)  Follow-up 3 months for Medicare Physical.

## 2024-09-06 ENCOUNTER — LAB (OUTPATIENT)
Dept: LAB | Facility: LAB | Age: 69
End: 2024-09-06
Payer: MEDICARE

## 2024-09-06 DIAGNOSIS — N18.31 TYPE 2 DIABETES MELLITUS WITH STAGE 3A CHRONIC KIDNEY DISEASE, WITHOUT LONG-TERM CURRENT USE OF INSULIN (MULTI): ICD-10-CM

## 2024-09-06 DIAGNOSIS — E11.22 TYPE 2 DIABETES MELLITUS WITH STAGE 3A CHRONIC KIDNEY DISEASE, WITHOUT LONG-TERM CURRENT USE OF INSULIN (MULTI): ICD-10-CM

## 2024-09-06 DIAGNOSIS — E78.2 MIXED HYPERLIPIDEMIA: ICD-10-CM

## 2024-09-06 LAB
ANION GAP SERPL CALC-SCNC: 10 MMOL/L
BUN SERPL-MCNC: 18 MG/DL (ref 8–25)
CALCIUM SERPL-MCNC: 9.4 MG/DL (ref 8.5–10.4)
CHLORIDE SERPL-SCNC: 98 MMOL/L (ref 97–107)
CHOLEST SERPL-MCNC: 138 MG/DL (ref 133–200)
CHOLEST/HDLC SERPL: 4.3 {RATIO}
CO2 SERPL-SCNC: 33 MMOL/L (ref 24–31)
CREAT SERPL-MCNC: 1.4 MG/DL (ref 0.4–1.6)
CREAT UR-MCNC: 68.6 MG/DL
EGFRCR SERPLBLD CKD-EPI 2021: 41 ML/MIN/1.73M*2
EST. AVERAGE GLUCOSE BLD GHB EST-MCNC: 137 MG/DL
GLUCOSE SERPL-MCNC: 112 MG/DL (ref 65–99)
HBA1C MFR BLD: 6.4 %
HDLC SERPL-MCNC: 32 MG/DL
LDLC SERPL CALC-MCNC: 74 MG/DL (ref 65–130)
MICROALBUMIN UR-MCNC: 15 MG/L (ref 0–23)
MICROALBUMIN/CREAT UR: 21.9 UG/MG CREAT
POTASSIUM SERPL-SCNC: 3.9 MMOL/L (ref 3.4–5.1)
SODIUM SERPL-SCNC: 141 MMOL/L (ref 133–145)
TRIGL SERPL-MCNC: 160 MG/DL (ref 40–150)

## 2024-09-06 PROCEDURE — 83036 HEMOGLOBIN GLYCOSYLATED A1C: CPT

## 2024-09-06 PROCEDURE — 80048 BASIC METABOLIC PNL TOTAL CA: CPT

## 2024-09-06 PROCEDURE — 36415 COLL VENOUS BLD VENIPUNCTURE: CPT

## 2024-09-06 PROCEDURE — 80061 LIPID PANEL: CPT

## 2024-09-06 PROCEDURE — 82043 UR ALBUMIN QUANTITATIVE: CPT

## 2024-09-06 PROCEDURE — 82570 ASSAY OF URINE CREATININE: CPT

## 2024-09-09 ENCOUNTER — HOSPITAL ENCOUNTER (OUTPATIENT)
Dept: RADIOLOGY | Facility: HOSPITAL | Age: 69
Discharge: HOME | End: 2024-09-09
Payer: MEDICARE

## 2024-09-09 DIAGNOSIS — R91.1 SOLITARY PULMONARY NODULE: ICD-10-CM

## 2024-09-09 PROCEDURE — 71250 CT THORAX DX C-: CPT

## 2024-12-11 ENCOUNTER — OFFICE VISIT (OUTPATIENT)
Dept: PRIMARY CARE | Facility: CLINIC | Age: 69
End: 2024-12-11
Payer: MEDICARE

## 2024-12-11 VITALS
OXYGEN SATURATION: 98 % | HEIGHT: 59 IN | HEART RATE: 77 BPM | BODY MASS INDEX: 37.29 KG/M2 | SYSTOLIC BLOOD PRESSURE: 134 MMHG | WEIGHT: 185 LBS | DIASTOLIC BLOOD PRESSURE: 82 MMHG

## 2024-12-11 DIAGNOSIS — Z78.0 ASYMPTOMATIC MENOPAUSAL STATE: ICD-10-CM

## 2024-12-11 DIAGNOSIS — Z12.31 ENCOUNTER FOR SCREENING MAMMOGRAM FOR BREAST CANCER: ICD-10-CM

## 2024-12-11 DIAGNOSIS — I10 PRIMARY HYPERTENSION: ICD-10-CM

## 2024-12-11 DIAGNOSIS — J44.9 CHRONIC OBSTRUCTIVE PULMONARY DISEASE, UNSPECIFIED COPD TYPE (MULTI): ICD-10-CM

## 2024-12-11 DIAGNOSIS — E11.22 TYPE 2 DIABETES MELLITUS WITH STAGE 3B CHRONIC KIDNEY DISEASE, WITHOUT LONG-TERM CURRENT USE OF INSULIN (MULTI): ICD-10-CM

## 2024-12-11 DIAGNOSIS — F17.200 TOBACCO DEPENDENCE SYNDROME: ICD-10-CM

## 2024-12-11 DIAGNOSIS — Z00.00 ROUTINE GENERAL MEDICAL EXAMINATION AT HEALTH CARE FACILITY: Primary | ICD-10-CM

## 2024-12-11 DIAGNOSIS — E78.2 MIXED HYPERLIPIDEMIA: ICD-10-CM

## 2024-12-11 DIAGNOSIS — N18.32 TYPE 2 DIABETES MELLITUS WITH STAGE 3B CHRONIC KIDNEY DISEASE, WITHOUT LONG-TERM CURRENT USE OF INSULIN (MULTI): ICD-10-CM

## 2024-12-11 DIAGNOSIS — I10 ESSENTIAL HYPERTENSION: ICD-10-CM

## 2024-12-11 PROCEDURE — 3079F DIAST BP 80-89 MM HG: CPT

## 2024-12-11 PROCEDURE — 99215 OFFICE O/P EST HI 40 MIN: CPT

## 2024-12-11 PROCEDURE — 3048F LDL-C <100 MG/DL: CPT

## 2024-12-11 PROCEDURE — 3008F BODY MASS INDEX DOCD: CPT

## 2024-12-11 PROCEDURE — 1158F ADVNC CARE PLAN TLK DOCD: CPT

## 2024-12-11 PROCEDURE — 3044F HG A1C LEVEL LT 7.0%: CPT

## 2024-12-11 PROCEDURE — 1159F MED LIST DOCD IN RCRD: CPT

## 2024-12-11 PROCEDURE — 1123F ACP DISCUSS/DSCN MKR DOCD: CPT

## 2024-12-11 PROCEDURE — 3061F NEG MICROALBUMINURIA REV: CPT

## 2024-12-11 PROCEDURE — G0439 PPPS, SUBSEQ VISIT: HCPCS

## 2024-12-11 PROCEDURE — 99214 OFFICE O/P EST MOD 30 MIN: CPT

## 2024-12-11 PROCEDURE — 1126F AMNT PAIN NOTED NONE PRSNT: CPT

## 2024-12-11 PROCEDURE — 3075F SYST BP GE 130 - 139MM HG: CPT

## 2024-12-11 RX ORDER — DAPAGLIFLOZIN 10 MG/1
10 TABLET, FILM COATED ORAL DAILY
Qty: 30 TABLET | Refills: 5 | Status: SHIPPED | OUTPATIENT
Start: 2024-12-11

## 2024-12-11 RX ORDER — LISINOPRIL AND HYDROCHLOROTHIAZIDE 12.5; 2 MG/1; MG/1
2 TABLET ORAL DAILY
Qty: 180 TABLET | Refills: 1 | Status: SHIPPED | OUTPATIENT
Start: 2024-12-11

## 2024-12-11 RX ORDER — ATORVASTATIN CALCIUM 40 MG/1
40 TABLET, FILM COATED ORAL DAILY
Qty: 90 TABLET | Refills: 1 | Status: SHIPPED | OUTPATIENT
Start: 2024-12-11

## 2024-12-11 RX ORDER — POTASSIUM CHLORIDE 750 MG/1
10 TABLET, FILM COATED, EXTENDED RELEASE ORAL DAILY
Qty: 90 TABLET | Refills: 1 | Status: SHIPPED | OUTPATIENT
Start: 2024-12-11

## 2024-12-11 ASSESSMENT — PAIN SCALES - GENERAL: PAINLEVEL_OUTOF10: 0-NO PAIN

## 2024-12-11 ASSESSMENT — ENCOUNTER SYMPTOMS
PALPITATIONS: 0
DIFFICULTY URINATING: 0
VOMITING: 0
DYSURIA: 0
ARTHRALGIAS: 0
FEVER: 0
MYALGIAS: 0
COUGH: 1
ADENOPATHY: 0
FATIGUE: 0
DIAPHORESIS: 0
DIARRHEA: 0
LIGHT-HEADEDNESS: 0
WHEEZING: 0
HEMATURIA: 0
BLOOD IN STOOL: 0
NAUSEA: 0
SHORTNESS OF BREATH: 0
SORE THROAT: 0

## 2024-12-11 ASSESSMENT — COGNITIVE AND FUNCTIONAL STATUS - GENERAL: VERBAL FLUENCY - ANIMAL NAMES (0 TO 25): 3

## 2024-12-11 NOTE — PROGRESS NOTES
Hospital course: 2017: presents for scheduled IOL.  without complications  2017: PPD#1, meeting all pp milestones    Interval History: She is doing well this morning. She is tolerating a regular diet without nausea or vomiting. She is voiding spontaneously. She is ambulating. She has passed flatus, and has not had a BM. Vaginal bleeding is moderate. She denies fever or chills. Abdominal pain is mild and controlled with oral medications. She is breastfeeding.     Objective:     Vital Signs (Most Recent):  Temp: 98.4 °F (36.9 °C) (17)  Pulse: 74 (17)  Resp: 18 (17)  BP: 115/68 (17)  SpO2: 98 % (17) Vital Signs (24h Range):  Temp:  [96.6 °F (35.9 °C)-98.4 °F (36.9 °C)] 98.4 °F (36.9 °C)  Pulse:  [46-88] 74  Resp:  [16-18] 18  SpO2:  [90 %-100 %] 98 %  BP: ()/(50-78) 115/68     Weight: 67.1 kg (148 lb)  Body mass index is 25.4 kg/m².      Intake/Output Summary (Last 24 hours) at 17 0626  Last data filed at 17 1651   Gross per 24 hour   Intake          4320.11 ml   Output              773 ml   Net          3547.11 ml       Significant Labs:  Lab Results   Component Value Date    GROUPTRH O NEG 2017    HEPBSAG Negative 2017    STREPBCULT  2017     STREPTOCOCCUS AGALACTIAE (GROUP B)  Beta-hemolytic streptococci are routinely susceptible to   penicillins,cephalosporins and carbapenems.         Recent Labs  Lab 17  0500   HGB 11.5*   HCT 33.9*       I have personallly reviewed all pertinent lab results from the last 24 hours.    Physical Exam:   Constitutional: She is oriented to person, place, and time. She appears well-developed and well-nourished. No distress.    HENT:   Head: Normocephalic and atraumatic.       Pulmonary/Chest: Effort normal. No respiratory distress.        Abdominal: Soft. She exhibits no distension. There is no tenderness. There is no rebound and no guarding.   Fundus firm below umbilicus      [facility]: FAMILY MEDICINE  MEDICARE WELLNESS EXAM      Leroy Dejesus is a 69 y.o. female that is presenting today for Annual Exam.    Subjective   hx COPD, HTN, HLD, NIDDM, tobacco use, hx cervical cancer (s/p cone procedure), CHF, lipoma on right cheek.    Other providers: Dr. Youngblood (pulmonologist-- needs new referral)    HTN: Controlled. Takes two Lisinopril-HCTZ 20mg -12.5 mg. Patient has not been recently checking home BP's. +occasional leg edema when warm outside. Denies chest pain, heart palpitations, changes in vision, headaches, syncope.    NIDDM: last A1c was 6.4 (9/6/2024). On 10 mg Farziga with co-morbid CKD. Stopped Metformin due to diarrhea, started Farxiga due to comorbid CKD. Checks daily blood sugars, usually in 130's. Last foot exam UTD 9/5/24. Eye exam 11/25/24 with opthamologist. GFR 41 (9/6/2024). +Statin, +ACE. PNA UTD.    CKD stage 3a: started on 10 mg Farziga. Last GFR 9/2024 improved to 41.     HLD: stable on 40 mg of Atorvastatin. Last lipid 9/2024.        Denies changes or concerns with hearing.   Review of Systems   Constitutional:  Negative for diaphoresis, fatigue and fever.   HENT:  Negative for congestion, hearing loss and sore throat.    Eyes:  Negative for visual disturbance.   Respiratory:  Positive for cough. Negative for shortness of breath and wheezing.    Cardiovascular:  Negative for chest pain, palpitations and leg swelling.   Gastrointestinal:  Negative for blood in stool, diarrhea, nausea and vomiting.   Genitourinary:  Negative for difficulty urinating, dysuria and hematuria.   Musculoskeletal:  Negative for arthralgias and myalgias.   Skin:  Negative for rash.   Allergic/Immunologic: Negative for immunocompromised state.   Neurological:  Negative for syncope and light-headedness.   Hematological:  Negative for adenopathy.   Psychiatric/Behavioral:  Negative for suicidal ideas.      ACTIVITIES OF DAILY LIVING:  Basic ADLs:  Problems with Bathing, Dressing,              Neurological: She is alert and oriented to person, place, and time.    Skin: Skin is warm and dry. She is not diaphoretic.    Psychiatric: She has a normal mood and affect. Her behavior is normal. Judgment and thought content normal.      Toileting, Transferring, Continence, Feeding No  Instrumental ADLs:  Problems with Ability to use phone, Shopping, Cooking, House-keeping, Laundry, Transportation, Medication Management, Finance Management No    Advance Care Planning   Advanced Care Planning was discussed with patient:  The patient has an active surrogate decision-maker on file. The patient does not have an advanced care plan on file.    History    Past Medical History:   Diagnosis Date    Personal history of malignant neoplasm of cervix uteri     History of cervical cancer     Past Surgical History:   Procedure Laterality Date    BI US GUIDED BREAST LOCALIZATION AND BIOPSY LEFT Left 3/29/2019    BI US GUIDED BREAST LOCALIZATION AND BIOPSY LEFT [facility]    OTHER SURGICAL HISTORY  01/24/2022    Cervical conization    OTHER SURGICAL HISTORY  01/24/2022    Cervical biopsy     Family History   Problem Relation Name Age of Onset    Lung cancer Mother      Cancer Mother      COPD Father      Lung disease Father      Diabetes Brother      Brain cancer Other          Sisters    Diabetes Other          Sisters    Stroke Other          Sisters    Thyroid disease Other          Sisters     Allergies   Allergen Reactions    Adhesive Tape-Silicones Unknown    Latex, Natural Rubber Itching     Current Outpatient Medications on File Prior to Visit   Medication Sig Dispense Refill    albuterol 90 mcg/actuation inhaler INHALE TWO PUFFS BY MOUTH EVERY 6 HOURS AS NEEDED      fluticasone-umeclidin-vilanter (TRELEGY-ELLIPTA) 200-62.5-25 mcg blister with device 1 puff Inhalation Once a day      [DISCONTINUED] atorvastatin (Lipitor) 40 mg tablet Take 1 tablet (40 mg) by mouth once daily. 90 tablet 1    [DISCONTINUED] dapagliflozin propanediol (Farxiga) 10 mg Take 1 tablet (10 mg) by mouth once daily. 30 tablet 5    [DISCONTINUED] lisinopriL-hydrochlorothiazide 20-12.5 mg tablet Take 2 tablets by mouth once daily. 180 tablet 1    [DISCONTINUED] potassium  chloride CR 10 mEq ER tablet Take 1 tablet (10 mEq) by mouth once daily. Take with food. 90 tablet 1    [DISCONTINUED] albuterol (Proventil HFA) 90 mcg/actuation inhaler 2 puffs Inhaler every 4 hours (Patient not taking: Reported on 12/11/2024)       No current facility-administered medications on file prior to visit.     Immunization History   Administered Date(s) Administered    COVID-19, mRNA, LNP-S, PF, 30 mcg/0.3 mL dose 04/03/2021, 04/21/2021, 10/21/2021    Pneumococcal conjugate vaccine, 20-valent (PREVNAR 20) 09/26/2023    Pneumococcal polysaccharide vaccine, 23-valent, age 2 years and older (PNEUMOVAX 23) 07/27/2021    Tdap vaccine, age 7 year and older (BOOSTRIX, ADACEL) 07/27/2021    Zoster vaccine, recombinant, adult (SHINGRIX) 09/26/2023, 03/12/2024     Patient's medical history was reviewed and updated either before or during this encounter.    Objective   Vitals:    12/11/24 0903   BP: 134/82   Pulse: 77   SpO2: 98%      Physical Exam  Constitutional:       General: She is not in acute distress.     Appearance: Normal appearance.   HENT:      Right Ear: Tympanic membrane and ear canal normal.      Left Ear: Tympanic membrane and ear canal normal.      Nose: Nose normal.      Mouth/Throat:      Mouth: Mucous membranes are moist.      Pharynx: Oropharynx is clear. No oropharyngeal exudate or posterior oropharyngeal erythema.   Eyes:      Extraocular Movements: Extraocular movements intact.      Conjunctiva/sclera: Conjunctivae normal.      Pupils: Pupils are equal, round, and reactive to light.   Neck:      Thyroid: No thyroid mass or thyromegaly.   Cardiovascular:      Rate and Rhythm: Normal rate and regular rhythm.      Pulses: Normal pulses.      Heart sounds: No murmur heard.     No gallop.   Pulmonary:      Effort: Pulmonary effort is normal.      Breath sounds: Decreased air movement present. No wheezing, rhonchi or rales.   Abdominal:      General: Bowel sounds are normal.      Palpations:  Abdomen is soft. There is no hepatomegaly, splenomegaly or mass.      Tenderness: There is no abdominal tenderness. There is no guarding.   Musculoskeletal:         General: Normal range of motion.      Right lower leg: No edema.      Left lower leg: No edema.   Lymphadenopathy:      Cervical: No cervical adenopathy.   Skin:     General: Skin is warm.      Findings: No rash.   Neurological:      General: No focal deficit present.      Mental Status: She is alert.      Motor: Motor function is intact. No weakness.   Psychiatric:         Mood and Affect: Mood normal.         Thought Content: Thought content normal.         Assessment/Plan    Diagnoses and all orders for this visit:  Routine general medical examination at health care facility  -     1 Year Follow Up In Primary Care - Wellness Exam; Future  Type 2 diabetes mellitus with stage 3b chronic kidney disease, without long-term current use of insulin (Multi)  -     Comprehensive metabolic panel; Future  -     Hemoglobin A1c; Future  -     dapagliflozin propanediol (Farxiga) 10 mg; Take 1 tablet (10 mg) by mouth once daily.  Chronic obstructive pulmonary disease, unspecified COPD type (Multi)  -     Referral to Pulmonology; Future  Essential hypertension  Mixed hyperlipidemia  -     atorvastatin (Lipitor) 40 mg tablet; Take 1 tablet (40 mg) by mouth once daily.  Primary hypertension  -     lisinopriL-hydrochlorothiazide 20-12.5 mg tablet; Take 2 tablets by mouth once daily.  -     potassium chloride CR 10 mEq ER tablet; Take 1 tablet (10 mEq) by mouth once daily. Take with food.  Asymptomatic menopausal state  -     XR DEXA bone density; Future  Encounter for screening mammogram for breast cancer  -     BI mammo bilateral screening tomosynthesis; Future  Tobacco dependence syndrome    Patient Active Problem List   Diagnosis    HTN (hypertension)    Type 2 diabetes mellitus with stage 3a chronic kidney disease, without long-term current use of insulin (Multi)     Hypokalemia    Chronic obstructive pulmonary disease (Multi)    Pulmonary emphysema (Multi)    Diastolic heart failure of unknown etiology    Hx of cervical cancer    Hyperlipidemia, unspecified    Menopausal state    Obstructive sleep apnea syndrome    Secondary polycythemia    Tobacco dependence syndrome    Venous thrombosis    Vitamin D deficiency    Essential hypertension    Obesity, morbid (Multi)     Follow-up 6 months DM/HTN/A1c    The patient was encouraged to ensure that any/all documentation is accurate and up to date, and that our office be provided a copy in the event that anything changes.    Fabienne Styles PA-C

## 2024-12-23 ENCOUNTER — HOSPITAL ENCOUNTER (OUTPATIENT)
Dept: RADIOLOGY | Facility: CLINIC | Age: 69
Discharge: HOME | End: 2024-12-23
Payer: MEDICARE

## 2024-12-23 DIAGNOSIS — Z78.0 ASYMPTOMATIC MENOPAUSAL STATE: ICD-10-CM

## 2024-12-23 PROCEDURE — 77080 DXA BONE DENSITY AXIAL: CPT

## 2025-01-02 ENCOUNTER — APPOINTMENT (OUTPATIENT)
Dept: RADIOLOGY | Facility: CLINIC | Age: 70
End: 2025-01-02
Payer: MEDICARE

## 2025-01-03 ENCOUNTER — APPOINTMENT (OUTPATIENT)
Dept: RADIOLOGY | Facility: CLINIC | Age: 70
End: 2025-01-03
Payer: MEDICARE

## 2025-01-17 ENCOUNTER — HOSPITAL ENCOUNTER (OUTPATIENT)
Dept: RADIOLOGY | Facility: CLINIC | Age: 70
Discharge: HOME | End: 2025-01-17
Payer: MEDICARE

## 2025-01-17 ENCOUNTER — LAB (OUTPATIENT)
Dept: LAB | Facility: LAB | Age: 70
End: 2025-01-17
Payer: MEDICARE

## 2025-01-17 VITALS — WEIGHT: 185 LBS | BODY MASS INDEX: 37.37 KG/M2

## 2025-01-17 DIAGNOSIS — Z12.31 ENCOUNTER FOR SCREENING MAMMOGRAM FOR BREAST CANCER: ICD-10-CM

## 2025-01-17 DIAGNOSIS — E11.22 TYPE 2 DIABETES MELLITUS WITH STAGE 3B CHRONIC KIDNEY DISEASE, WITHOUT LONG-TERM CURRENT USE OF INSULIN (MULTI): ICD-10-CM

## 2025-01-17 DIAGNOSIS — N18.32 TYPE 2 DIABETES MELLITUS WITH STAGE 3B CHRONIC KIDNEY DISEASE, WITHOUT LONG-TERM CURRENT USE OF INSULIN (MULTI): ICD-10-CM

## 2025-01-17 LAB
ALBUMIN SERPL BCP-MCNC: 4 G/DL (ref 3.4–5)
ALP SERPL-CCNC: 68 U/L (ref 33–136)
ALT SERPL W P-5'-P-CCNC: 9 U/L (ref 7–45)
ANION GAP SERPL CALCULATED.3IONS-SCNC: 11 MMOL/L (ref 10–20)
AST SERPL W P-5'-P-CCNC: 12 U/L (ref 9–39)
BILIRUB SERPL-MCNC: 0.6 MG/DL (ref 0–1.2)
BUN SERPL-MCNC: 16 MG/DL (ref 6–23)
CALCIUM SERPL-MCNC: 9.6 MG/DL (ref 8.6–10.3)
CHLORIDE SERPL-SCNC: 96 MMOL/L (ref 98–107)
CO2 SERPL-SCNC: 36 MMOL/L (ref 21–32)
CREAT SERPL-MCNC: 1.2 MG/DL (ref 0.5–1.05)
EGFRCR SERPLBLD CKD-EPI 2021: 49 ML/MIN/1.73M*2
EST. AVERAGE GLUCOSE BLD GHB EST-MCNC: 137 MG/DL
GLUCOSE SERPL-MCNC: 98 MG/DL (ref 74–99)
HBA1C MFR BLD: 6.4 %
POTASSIUM SERPL-SCNC: 3.8 MMOL/L (ref 3.5–5.3)
PROT SERPL-MCNC: 6.7 G/DL (ref 6.4–8.2)
SODIUM SERPL-SCNC: 139 MMOL/L (ref 136–145)

## 2025-01-17 PROCEDURE — 83036 HEMOGLOBIN GLYCOSYLATED A1C: CPT

## 2025-01-17 PROCEDURE — 80053 COMPREHEN METABOLIC PANEL: CPT

## 2025-01-17 PROCEDURE — 77067 SCR MAMMO BI INCL CAD: CPT

## 2025-01-20 ENCOUNTER — TELEPHONE (OUTPATIENT)
Dept: PRIMARY CARE | Facility: CLINIC | Age: 70
End: 2025-01-20
Payer: MEDICARE

## 2025-03-20 NOTE — PROGRESS NOTES
" Patient: Leroy Dejesus    MRN: [id number]  : 1955 -- AGE: 69 y.o.    Provider: DON Chase     Location [facility]   Service Date: 3/21/2025       Department of Medicine  Division of Pulmonary, Critical Care, and Sleep Medicine       [facility]  New Visit Note    HISTORY OF PRESENT ILLNESS     The patient's referring provider is: Fabienne Styles PA-C    PCP: Fabienne Styles PA-C   Pulm: Abby Mota CNP (Former)     HISTORY OF PRESENT ILLNESS   Leroy Dejesus is a 69 y.o. female who presents to [facility] for an evaluation with concerns of Consult and COPD.   I have independently interviewed and examined the patient in the office and reviewed available records.    Current History  Patient presents to pulmonary clinic today for new patient establishment; concern of COPD, hypoxia. Former pulmonary patient of Dr. Agustin and Abby Mota CNP; transitioning care to .     On today's visit, the patient states she was diagnosed with COPD about 6 years ago. Currently takes Trelegy 200; takes daily. Has been on this for a few years. States it is too expensive. Wants something cheaper. Will be referring her to  pharmacy team for assistance. Will also try switching her to Breztri. Feels like she needs albuterol maybe twice a week. Monitors her oxygen levels. States she will periodically sleep with oxygen on. Will periodically wear oxygen in the mornings upon waking.     Currently, patient reports symptoms of:   []None  []SOB at Rest               [x]KULKARNI; only if really over exerting herself              [x]Cough: Productive and Clear/White Colored Mucous           []Wheezing               []Chest Tightness  []Orthopnea   []Lower Leg Edema   []Chest Pain  []Palpitations   []GERD   [x]Rhinitis; intermittent use of OTC \"nasal spray\" unknown what she takes  []Throat Clearing  []Voice Hoarseness    Prior " Pulmonary History:   []None  []Born Premature  []Pulmonary Issues in Childhood  []Pulmonary Focused Hospitalizations  [x]Hx of Home Oxygen Use; has been using it for about 5 years    Prior/Current Inhaler History:   []None                             []ICS:        [x]JAYNE: Albuterol HFA and Albuterol Nebulizer   []ICS/LABA:       []JAYNE/MAVIS:      [x]ICS/LABA/LAMA: Trelegy Ellipta  []LABA:       []Other:   []LAMA:   []LABA/LAMA:     Sleep History:  []None     []Witnessed Apneic Events/Abnormal Breathing Patterns [x]Dozing Off Easily  []Completed a Sleep Study in the Past []Waking Up Choking/Gasping    [x]Daytime Napping  []Has Been Diagnosed with ERICA  []Morning Headaches     []Wears CPAP/BiPAP  []Snoring     [x]Excessive Daytime Fatigue     [x]Wears Nocturnal Oxygen  States she has no interest in seeing sleep medicine or doing a sleep study. Thinks she did a nocturnal oximetry study some years ago. Was told to wear oxygen at night.    CAT Today: 26  ACT Today: 16  ESS Today: 5    Prior Notes & History   Prior pulmonary note from Abby Mota CNP (1/16/25)   Patient formerly under the care of Dr. Agustin, transitioned in Sept 2024 to seeing me. She has history of lung nodules: 8mm ground glass with current tobacco  use: annual Ct's recommended.   She has chronic hypoxic respiratory failure due to COPD, is oxygen dependent wearing oxygen 3L exertion 2L sleep and Trelegy 200 daily with prn albuterol.        Reports overall doing great not SOB, does have a wood burning stove to heat house and causes her to cough at night.  Keeps inhaler next to bed   Here without her oxygen today. Uses oxygen at night and in the morning, can do short tasks without it.    Oxygen not near wood burning fireplace.     REVIEW OF SYSTEMS     REVIEW OF SYSTEMS  Review of Systems   Constitutional:  Negative for activity change, appetite change, chills, fatigue, fever and unexpected weight change.   HENT:  Negative for congestion, postnasal drip,  rhinorrhea, sinus pressure, sinus pain, sneezing, sore throat, trouble swallowing and voice change.         Denies throat clearing   Eyes:  Negative for redness and itching.   Respiratory:  Positive for cough and shortness of breath. Negative for chest tightness, wheezing and stridor.    Cardiovascular:  Negative for chest pain, palpitations and leg swelling.        Denies orthopnea   Gastrointestinal:  Negative for abdominal pain, diarrhea, nausea and vomiting.        Denies acid reflux   Musculoskeletal:  Negative for arthralgias, back pain, joint swelling and myalgias.   Skin:  Negative for rash.   Allergic/Immunologic: Negative for immunocompromised state.   Neurological:  Negative for dizziness, tremors, weakness and headaches.   Hematological:  Does not bruise/bleed easily.   Psychiatric/Behavioral:  Negative for agitation and sleep disturbance. The patient is not nervous/anxious.         Denies depression   All other systems reviewed and are negative.      ALLERGIES & MEDICATIONS     ALLERGIES  Allergies   Allergen Reactions    Adhesive Tape-Silicones Unknown    Latex, Natural Rubber Itching       MEDICATIONS  Current Outpatient Medications   Medication Sig Dispense Refill    albuterol 2.5 mg /3 mL (0.083 %) nebulizer solution Take 3 mL (2.5 mg) by nebulization every 8 hours if needed for wheezing or shortness of breath.      albuterol 90 mcg/actuation inhaler INHALE TWO PUFFS BY MOUTH EVERY 6 HOURS AS NEEDED      atorvastatin (Lipitor) 40 mg tablet Take 1 tablet (40 mg) by mouth once daily. 90 tablet 1    dapagliflozin propanediol (Farxiga) 10 mg Take 1 tablet (10 mg) by mouth once daily. 30 tablet 5    fluticasone-umeclidin-vilanter (TRELEGY-ELLIPTA) 200-62.5-25 mcg blister with device 1 puff Inhalation Once a day      lisinopriL-hydrochlorothiazide 20-12.5 mg tablet Take 2 tablets by mouth once daily. 180 tablet 1    potassium chloride CR 10 mEq ER tablet Take 1 tablet (10 mEq) by mouth once daily. Take  with food. 90 tablet 1     No current facility-administered medications for this visit.       PAST HISTORY     PAST MEDICAL HISTORY  She  has a past medical history of COPD (chronic obstructive pulmonary disease) (Multi), Hypertension, Ovarian cancer (Multi), and Personal history of malignant neoplasm of cervix uteri.    PAST SURGICAL HISTORY  Past Surgical History:   Procedure Laterality Date    BI US GUIDED BREAST LOCALIZATION AND BIOPSY LEFT Left 3/29/2019    BI US GUIDED BREAST LOCALIZATION AND BIOPSY LEFT [facility]    OTHER SURGICAL HISTORY  01/24/2022    Cervical conization    OTHER SURGICAL HISTORY  01/24/2022    Cervical biopsy       IMMUNIZATION HISTORY  Immunization History   Administered Date(s) Administered    COVID-19, mRNA, LNP-S, PF, 30 mcg/0.3 mL dose 04/03/2021, 04/21/2021, 10/21/2021    Pneumococcal conjugate vaccine, 20-valent (PREVNAR 20) 09/26/2023    Pneumococcal polysaccharide vaccine, 23-valent, age 2 years and older (PNEUMOVAX 23) 07/27/2021    Tdap vaccine, age 7 year and older (BOOSTRIX, ADACEL) 07/27/2021    Zoster vaccine, recombinant, adult (SHINGRIX) 09/26/2023, 03/12/2024       SOCIAL HISTORY  She  reports that she has been smoking cigarettes. She started smoking about 45 years ago. She has a 45.2 pack-year smoking history. She has been exposed to tobacco smoke. She has never used smokeless tobacco. She reports that she does not currently use alcohol. She reports that she does not currently use drugs.   Has been at 0.75 ppd for the past year  Has tried patches (allergic to them) and other various smoking cessation techniques. Nothing works for her. States she has to just quit on her own.    OCCUPATIONAL/ENVIRONMENTAL HISTORY  Previously worked as: janitorial cleaning  Currently works as: retired  Exposure Hx:  [x]None  []Asbestos  []Silica  []Beryllium/Inhaled Metals  []Birds  []Exotic Animals  []Other  Has wood burning stove in the home      FAMILY HISTORY  Family History  "  Problem Relation Name Age of Onset    Lung cancer Mother Stephie casey     Cancer Mother Stephie casey     COPD Mother Stephie casey     COPD Father Nikolay casey     Lung disease Father Nikolay casey     Diabetes Brother      Brain cancer Other          Sisters    Diabetes Other          Sisters    Stroke Other          Sisters    Thyroid disease Other          Sisters       PHYSICAL EXAM     VITAL SIGNS: /68   Pulse 86   Ht 1.499 m (4' 11\")   Wt 79.4 kg (175 lb)   SpO2 94%   BMI 35.35 kg/m²      PREVIOUS WEIGHTS:  Wt Readings from Last 3 Encounters:   03/21/25 79.4 kg (175 lb)   01/17/25 83.9 kg (185 lb)   12/11/24 83.9 kg (185 lb)       Physical Exam  Vitals reviewed.   Constitutional:       General: She is not in acute distress.     Appearance: Normal appearance. She is not ill-appearing or toxic-appearing.   HENT:      Head: Normocephalic.      Nose: No rhinorrhea.   Cardiovascular:      Rate and Rhythm: Normal rate and regular rhythm.      Heart sounds: Normal heart sounds.   Pulmonary:      Effort: Pulmonary effort is normal. No respiratory distress.      Breath sounds: Normal breath sounds. No stridor. No wheezing, rhonchi or rales.      Comments: Overall diminished lung sounds all over  Abdominal:      General: Abdomen is flat.   Musculoskeletal:         General: Normal range of motion.      Right lower leg: No edema.      Left lower leg: No edema.   Skin:     General: Skin is warm and dry.      Nails: There is no clubbing.   Neurological:      General: No focal deficit present.      Mental Status: She is alert and oriented to person, place, and time.   Psychiatric:         Mood and Affect: Mood normal.         Behavior: Behavior normal.         Judgment: Judgment normal.         RESULTS/DATA     Pulmonary Function Test Results     No PFT on record    Chest Radiograph     No results found for this or any previous visit from the past 365 days.      Chest CT Scan   Chest CT   9/9/24: IMPRESSION: 1. 8 mm " "subpleural ground-glass nodule right lower lobe is unchanged. Consider follow up non contrast chest CT every 2 years until 5 years.      Lung CA Screening Chest CT   3/4/24: IMPRESSION: 1. There is a 7 mm right lower lobe ground-glass/nodular density. While this may be post inflammatory/benign in nature, a six-month low-dose chest CT is recommended for surveillance. 2. Severe coronary artery calcifications and correlate with coronary artery disease risk factors. LUNG RADS CATEGORY: Lung Rad: Lung-RADS 3     Echocardiogram & Cardiac Studies   Echo   3/14/18: Mild concentric Left ventricle hypertrophy.   There is normal left ventricular systolic function.   Estimated ejection fraction is 55-59%.   The right ventricular cavity size is moderate to severely dilated.   The right ventricular global systolic function is moderately reduced.   The left atrial size is mildly dilated.   Right atrial cavity size is moderately dilated.   A small pericardial effusion is visualized.   There is no evidence of cardiac tamponade.   Mild mitral regurgitation.   Mild tricuspid regurgitation.   There is evidence of mild pulmonary hypertension.        Labwork & Pathology   Complete Blood Count  Lab Results   Component Value Date    WBC 12.1 (H) 08/08/2022    HGB 12.6 08/08/2022    HCT 39.5 08/08/2022    MCV 94 08/08/2022     08/08/2022     Peripheral Eosinophil Count:   Eosinophils Absolute (K/UL)   Date Value   03/13/2018 0.10     Immunocap IgE  No results found for: \"ICIGE\"    Bronchoscopy & Pathology/Cultures       ASSESSMENT/PLAN     Ms. Dejesus is a 69 y.o. female; was referred to the [facility] for evaluation of Consult and COPD    Problem List and Orders  Diagnoses and all orders for this visit:  Chronic obstructive pulmonary disease, unspecified COPD type (Multi)  -     Referral to Pulmonology  -     Referral to Clinical Pharmacy; Future  -     budesonide-glycopyr-formoterol (Breztri " Aerosphere) 160-9-4.8 mcg/actuation HFA aerosol inhaler; Inhale 2 puffs 2 times a day. Rinse mouth with water after use to reduce aftertaste and incidence of candidiasis. Do not swallow.  -     ipratropium-albuteroL (Duo-Neb) 0.5-2.5 mg/3 mL nebulizer solution; Take 3 mL by nebulization every 6 hours if needed for shortness of breath.  -     Complete Pulmonary Function Test Pre/Post Bronchodilator (Spirometry Pre/Post/DLCO/Lung Volumes); Future  -     albuterol 2.5 mg /3 mL (0.083 %) nebulizer solution 3 mL  -     albuterol 90 mcg/actuation inhaler 1 puff  -     Exhaled Nitric Oxide (FeNO); Future  -     Pulmonary Stress Test (6 Min. Walk); Future  Chronic respiratory failure with hypoxia (Multi)  -     Pulmonary Stress Test (6 Min. Walk); Future  -     Overnight Pulse Oximetry Test  Nicotine dependence, cigarettes, uncomplicated  -     CT lung screening low dose; Future  Lung nodule  -     CT lung screening low dose; Future      Assessment and Plan / Recommendations:  COPD: States she was diagnosed about 6 years ago.  No PFT on record that I can find from her prior pulmonary office.  Has been on Trelegy 200 for the past few years.  States that it is very expensive and would like to switch to something different.  Denies any shortness of breath at rest but will typically have increased dyspnea on more exertional activity.  She does have a chronic cough which typically produces a clear-colored mucus.  Denies any wheezing.  -Stop Trelegy 200 (too expensive)  -Start Breztri  -Referral to  clinical pharmacy team to help with inhaler coverage  -Continue albuterol HFA as needed  -Stop albuterol nebs  -Start DuoNebs as needed  -Ordering updated full PFT, FeNO  -Will refer to pulmonary rehab if qualified    2. Chronic Hypoxic Respiratory Failure: States she has been given oxygen as of 5 years ago.  Typically wears 2 L on exertion but will sometimes wear periodically throughout the day.  She will also periodically wear at  night.  Believes she did a nocturnal oximetry study with her former pulmonologist; nothing on record.  -Ordering updated 6-minute walk test to determine current oxygen needs  -Ordering nocturnal oximetry study on room air to determine nocturnal needs  -Continue to wear supplemental oxygen in order to maintain SpO2 levels greater than 88 to 90%    3. Nicotine Dependence & Lung Nodule: current cigarette smoker; averaging 1 ppd since age 24. 45 total pack years. Currently down to 0.75 ppd. LCS CT from 3/4/24 showing 7 mm RLL GGO. Repeat Chest CT 9/9/24 shows stable RLL GGO.  -Ordering annual lung cancer screening chest CT; due 9/10/25  - >3 minutes spent on smoking cessation  -She has attempted nicotine patches (had allergic reaction), and other various smoking cessation modalities but nothing has ever seem to work for her.  She feels like she just needs to make up her mind to quit on her own.    RTC 3 months    Nima Yarbrough, CNP  [facility] Nurse Practitioner    *This note was dictated using DRAGON speech recognition software and was corrected for spelling or grammatical errors, but despite proofreading several typographical errors might be present that might affect the meaning of the content.*

## 2025-03-21 ENCOUNTER — APPOINTMENT (OUTPATIENT)
Dept: PULMONOLOGY | Facility: CLINIC | Age: 70
End: 2025-03-21
Payer: MEDICARE

## 2025-03-21 VITALS
HEIGHT: 59 IN | SYSTOLIC BLOOD PRESSURE: 116 MMHG | HEART RATE: 86 BPM | WEIGHT: 175 LBS | DIASTOLIC BLOOD PRESSURE: 68 MMHG | BODY MASS INDEX: 35.28 KG/M2 | OXYGEN SATURATION: 94 %

## 2025-03-21 DIAGNOSIS — R91.1 LUNG NODULE: ICD-10-CM

## 2025-03-21 DIAGNOSIS — J44.9 CHRONIC OBSTRUCTIVE PULMONARY DISEASE, UNSPECIFIED COPD TYPE (MULTI): Primary | ICD-10-CM

## 2025-03-21 DIAGNOSIS — F17.210 NICOTINE DEPENDENCE, CIGARETTES, UNCOMPLICATED: ICD-10-CM

## 2025-03-21 DIAGNOSIS — J96.11 CHRONIC RESPIRATORY FAILURE WITH HYPOXIA (MULTI): ICD-10-CM

## 2025-03-21 RX ORDER — ALBUTEROL SULFATE 90 UG/1
1 INHALANT RESPIRATORY (INHALATION) ONCE
OUTPATIENT
Start: 2025-03-21

## 2025-03-21 RX ORDER — IPRATROPIUM BROMIDE AND ALBUTEROL SULFATE 2.5; .5 MG/3ML; MG/3ML
3 SOLUTION RESPIRATORY (INHALATION) EVERY 6 HOURS PRN
Qty: 360 ML | Refills: 5 | Status: SHIPPED | OUTPATIENT
Start: 2025-03-21

## 2025-03-21 RX ORDER — ALBUTEROL SULFATE 0.83 MG/ML
3 SOLUTION RESPIRATORY (INHALATION) ONCE
OUTPATIENT
Start: 2025-03-21 | End: 2025-03-21

## 2025-03-21 RX ORDER — ALBUTEROL SULFATE 0.83 MG/ML
2.5 SOLUTION RESPIRATORY (INHALATION) EVERY 8 HOURS PRN
COMMUNITY
Start: 2024-06-14 | End: 2025-03-21 | Stop reason: ALTCHOICE

## 2025-03-21 RX ORDER — BUDESONIDE, GLYCOPYRROLATE, AND FORMOTEROL FUMARATE 160; 9; 4.8 UG/1; UG/1; UG/1
2 AEROSOL, METERED RESPIRATORY (INHALATION)
Qty: 10.7 G | Refills: 5 | Status: SHIPPED | OUTPATIENT
Start: 2025-03-21

## 2025-03-21 ASSESSMENT — ENCOUNTER SYMPTOMS
RHINORRHEA: 0
WEAKNESS: 0
COUGH: 1
SINUS PAIN: 0
BACK PAIN: 0
STRIDOR: 0
WHEEZING: 0
JOINT SWELLING: 0
ACTIVITY CHANGE: 0
AGITATION: 0
NAUSEA: 0
SORE THROAT: 0
PALPITATIONS: 0
FEVER: 0
MYALGIAS: 0
EYE REDNESS: 0
UNEXPECTED WEIGHT CHANGE: 0
ARTHRALGIAS: 0
HEADACHES: 0
TROUBLE SWALLOWING: 0
CHEST TIGHTNESS: 0
CHILLS: 0
ABDOMINAL PAIN: 0
DIARRHEA: 0
ROS GI COMMENTS: DENIES ACID REFLUX
SHORTNESS OF BREATH: 1
SINUS PRESSURE: 0
TREMORS: 0
VOICE CHANGE: 0
SLEEP DISTURBANCE: 0
FATIGUE: 0
EYE ITCHING: 0
APPETITE CHANGE: 0
VOMITING: 0
DIZZINESS: 0
NERVOUS/ANXIOUS: 0
BRUISES/BLEEDS EASILY: 0

## 2025-03-21 ASSESSMENT — PATIENT HEALTH QUESTIONNAIRE - PHQ9
1. LITTLE INTEREST OR PLEASURE IN DOING THINGS: NOT AT ALL
SUM OF ALL RESPONSES TO PHQ9 QUESTIONS 1 & 2: 0
2. FEELING DOWN, DEPRESSED OR HOPELESS: NOT AT ALL

## 2025-03-21 ASSESSMENT — LIFESTYLE VARIABLES: HOW MANY STANDARD DRINKS CONTAINING ALCOHOL DO YOU HAVE ON A TYPICAL DAY: PATIENT DOES NOT DRINK

## 2025-03-21 ASSESSMENT — PAIN SCALES - GENERAL: PAINLEVEL_OUTOF10: 0-NO PAIN

## 2025-03-21 NOTE — PATIENT INSTRUCTIONS
"Today we discussed your pulmonary history, symptoms and plan moving forward.    -Ordering pulmonary function testing. (Please stop using your daily maintenance inhaler(s) for 5 days prior to testing date. You may use your as needed albuterol inhaler/nebulizer during this time frame if needed. Please resume your daily maintenance inhaler(s) after completion of testing).  -Ordering annual lung cancer screening chest CT; due 9/10/2025.  You will need to cancel the previously ordered/scheduled CT that your former pulmonologist ordered for you.  -Start Breztri Aerosphere (budesonide + glycopyrrolate + formoterol); 2 inhalations twice a day. Rinse mouth after use to avoid thrush. This is your long acting daily maintenance inhaler. Please contact my nurse if you have any difficulty getting this prescription.  -Once you get the new Breztri inhaler you can stop use of the Trelegy  -Referral to  clinical pharmacy team to help with inhaler coverage  -Continue Albuterol Inhaler; 2 puffs every 4-6 hours as needed for shortness of breath. You can also take this 10-15 minutes prior to exertional activity to help \"prime\" your lungs.  -Stop albuterol nebulizers  -Start DuoNeb (albuterol + ipratropium) nebulizer treatments; 1 unit dose every 6 hours as needed for shortness of breath/cough. Can also use these treatments daily/scheduled if you find them helpful.  -Ordering nocturnal oximetry study on room air to determine your nighttime oxygen needs  -Continue to monitor your oxygen levels closely.  Goal is to remain above 88 to 90%.  -Cigarette/nicotine use is harming your health; and specifically exacerbating the risk of primary lung cancer. Smoking causes 85-90% of all lung cancers.  I encourage your current efforts to stop and recommended that you stop smoking entirely. I recommended community support groups, and use local tobacco cessation hotlines 8-089-Quit-Now (1-879.305.9881). If you are ready to quit, I advise you letting " either your primary care provider or myself know so we can discuss smoking cessation techniques/treatments. *E-cigarettes should not replace smoking or be used to help quit smoking.    Thank you for visiting the pulmonary clinic today! It was a pleasure to participate in your care.  Please return to clinic 3 months or sooner if needed.    Nima Yarbrough, CNP  My Office Number: [phone]   CT Scheduling: [phone]  PFT (Pulmonary Function Test) Scheduling: [phone]  Follow Up Visit Scheduling: [phone]  My Nurse: Radha Henley, RN & Wendy Laboy, RN    To reach the nurse, Radha Henley RN, please call ([phone]). If unable to reach Radha, please contact Wendy Laboy, RN at ([phone]). Both nurses have secure voicemail's if needing to leave a message.    **For urgent needs such as medication issues/refills, active sick symptoms or medical concerns please call the office directly and speak to the pulmonary nurse. For nonurgent messages please use [employer]. Thank you.**

## 2025-06-02 ENCOUNTER — APPOINTMENT (OUTPATIENT)
Dept: RESPIRATORY THERAPY | Facility: HOSPITAL | Age: 70
End: 2025-06-02
Payer: MEDICARE

## 2025-06-02 ENCOUNTER — APPOINTMENT (OUTPATIENT)
Dept: RESPIRATORY THERAPY | Facility: CLINIC | Age: 70
End: 2025-06-02
Payer: MEDICARE

## 2025-06-06 DIAGNOSIS — E11.22 TYPE 2 DIABETES MELLITUS WITH STAGE 3B CHRONIC KIDNEY DISEASE, WITHOUT LONG-TERM CURRENT USE OF INSULIN (MULTI): ICD-10-CM

## 2025-06-06 DIAGNOSIS — N18.32 TYPE 2 DIABETES MELLITUS WITH STAGE 3B CHRONIC KIDNEY DISEASE, WITHOUT LONG-TERM CURRENT USE OF INSULIN (MULTI): ICD-10-CM

## 2025-06-06 DIAGNOSIS — E78.2 MIXED HYPERLIPIDEMIA: ICD-10-CM

## 2025-06-06 DIAGNOSIS — I10 PRIMARY HYPERTENSION: ICD-10-CM

## 2025-06-06 RX ORDER — POTASSIUM CHLORIDE 750 MG/1
10 TABLET, EXTENDED RELEASE ORAL DAILY
Qty: 90 TABLET | Refills: 1 | Status: SHIPPED | OUTPATIENT
Start: 2025-06-06

## 2025-06-06 RX ORDER — ATORVASTATIN CALCIUM 40 MG/1
40 TABLET, FILM COATED ORAL DAILY
Qty: 90 TABLET | Refills: 1 | Status: SHIPPED | OUTPATIENT
Start: 2025-06-06

## 2025-06-06 RX ORDER — DAPAGLIFLOZIN 10 MG/1
10 TABLET, FILM COATED ORAL DAILY
Qty: 30 TABLET | Refills: 1 | Status: SHIPPED | OUTPATIENT
Start: 2025-06-06

## 2025-06-09 ENCOUNTER — HOSPITAL ENCOUNTER (OUTPATIENT)
Dept: RESPIRATORY THERAPY | Facility: HOSPITAL | Age: 70
Discharge: HOME | End: 2025-06-09
Payer: MEDICARE

## 2025-06-09 DIAGNOSIS — J44.9 CHRONIC OBSTRUCTIVE PULMONARY DISEASE, UNSPECIFIED COPD TYPE (MULTI): ICD-10-CM

## 2025-06-09 DIAGNOSIS — J96.11 CHRONIC RESPIRATORY FAILURE WITH HYPOXIA: ICD-10-CM

## 2025-06-09 PROCEDURE — 94726 PLETHYSMOGRAPHY LUNG VOLUMES: CPT

## 2025-06-09 PROCEDURE — 94618 PULMONARY STRESS TESTING: CPT

## 2025-06-09 PROCEDURE — 9420000001 HC RT PATIENT EDUCATION 5 MIN

## 2025-06-09 PROCEDURE — 94618 PULMONARY STRESS TESTING: CPT | Performed by: INTERNAL MEDICINE

## 2025-06-09 PROCEDURE — 94060 EVALUATION OF WHEEZING: CPT

## 2025-06-09 PROCEDURE — 94726 PLETHYSMOGRAPHY LUNG VOLUMES: CPT | Performed by: INTERNAL MEDICINE

## 2025-06-09 PROCEDURE — 94760 N-INVAS EAR/PLS OXIMETRY 1: CPT | Mod: CCI

## 2025-06-09 PROCEDURE — 94729 DIFFUSING CAPACITY: CPT

## 2025-06-09 PROCEDURE — 94060 EVALUATION OF WHEEZING: CPT | Performed by: INTERNAL MEDICINE

## 2025-06-09 PROCEDURE — 94664 DEMO&/EVAL PT USE INHALER: CPT | Mod: 59

## 2025-06-09 PROCEDURE — 95012 NITRIC OXIDE EXP GAS DETER: CPT

## 2025-06-10 LAB
MGC ASCENT PFT - FEV1 - POST: 0.76
MGC ASCENT PFT - FEV1 - POST: 0.76
MGC ASCENT PFT - FEV1 - PRE: 0.68
MGC ASCENT PFT - FEV1 - PRE: 0.68
MGC ASCENT PFT - FEV1 - PREDICTED: 1.77
MGC ASCENT PFT - FEV1 - PREDICTED: 1.77
MGC ASCENT PFT - FVC - POST: 0.9
MGC ASCENT PFT - FVC - POST: 0.9
MGC ASCENT PFT - FVC - PRE: 0.97
MGC ASCENT PFT - FVC - PRE: 0.97
MGC ASCENT PFT - FVC - PREDICTED: 2.23
MGC ASCENT PFT - FVC - PREDICTED: 2.23

## 2025-06-11 ENCOUNTER — OFFICE VISIT (OUTPATIENT)
Dept: PRIMARY CARE | Facility: CLINIC | Age: 70
End: 2025-06-11
Payer: MEDICARE

## 2025-06-11 VITALS
HEART RATE: 54 BPM | OXYGEN SATURATION: 92 % | SYSTOLIC BLOOD PRESSURE: 128 MMHG | BODY MASS INDEX: 36.29 KG/M2 | WEIGHT: 180 LBS | HEIGHT: 59 IN | DIASTOLIC BLOOD PRESSURE: 86 MMHG

## 2025-06-11 DIAGNOSIS — I10 PRIMARY HYPERTENSION: ICD-10-CM

## 2025-06-11 DIAGNOSIS — N18.31 TYPE 2 DIABETES MELLITUS WITH STAGE 3A CHRONIC KIDNEY DISEASE, WITHOUT LONG-TERM CURRENT USE OF INSULIN (MULTI): Primary | ICD-10-CM

## 2025-06-11 DIAGNOSIS — E78.2 MIXED HYPERLIPIDEMIA: ICD-10-CM

## 2025-06-11 DIAGNOSIS — E11.22 TYPE 2 DIABETES MELLITUS WITH STAGE 3A CHRONIC KIDNEY DISEASE, WITHOUT LONG-TERM CURRENT USE OF INSULIN (MULTI): Primary | ICD-10-CM

## 2025-06-11 DIAGNOSIS — J44.9 CHRONIC OBSTRUCTIVE PULMONARY DISEASE, UNSPECIFIED COPD TYPE (MULTI): ICD-10-CM

## 2025-06-11 PROBLEM — J43.9 PULMONARY EMPHYSEMA (MULTI): Status: RESOLVED | Noted: 2023-09-14 | Resolved: 2025-06-11

## 2025-06-11 LAB — POC HEMOGLOBIN A1C: 6.4 % (ref 4.2–6.5)

## 2025-06-11 PROCEDURE — 99214 OFFICE O/P EST MOD 30 MIN: CPT

## 2025-06-11 PROCEDURE — 3044F HG A1C LEVEL LT 7.0%: CPT

## 2025-06-11 PROCEDURE — 3008F BODY MASS INDEX DOCD: CPT

## 2025-06-11 PROCEDURE — 1159F MED LIST DOCD IN RCRD: CPT

## 2025-06-11 PROCEDURE — 3079F DIAST BP 80-89 MM HG: CPT

## 2025-06-11 PROCEDURE — G2211 COMPLEX E/M VISIT ADD ON: HCPCS

## 2025-06-11 PROCEDURE — 4004F PT TOBACCO SCREEN RCVD TLK: CPT

## 2025-06-11 PROCEDURE — 1126F AMNT PAIN NOTED NONE PRSNT: CPT

## 2025-06-11 PROCEDURE — 3074F SYST BP LT 130 MM HG: CPT

## 2025-06-11 PROCEDURE — 83036 HEMOGLOBIN GLYCOSYLATED A1C: CPT | Mod: QW

## 2025-06-11 ASSESSMENT — PAIN SCALES - GENERAL: PAINLEVEL_OUTOF10: 0-NO PAIN

## 2025-06-11 ASSESSMENT — PATIENT HEALTH QUESTIONNAIRE - PHQ9
2. FEELING DOWN, DEPRESSED OR HOPELESS: NOT AT ALL
1. LITTLE INTEREST OR PLEASURE IN DOING THINGS: NOT AT ALL
SUM OF ALL RESPONSES TO PHQ9 QUESTIONS 1 AND 2: 0

## 2025-06-11 ASSESSMENT — ENCOUNTER SYMPTOMS
FATIGUE: 0
LIGHT-HEADEDNESS: 0
SHORTNESS OF BREATH: 0
PALPITATIONS: 0
DIZZINESS: 0

## 2025-06-11 NOTE — PROGRESS NOTES
"Subjective   Patient ID: Leroy Dejesus is a 69 y.o. female who presents for Diabetes.    hx COPD, HTN, HLD, NIDDM, tobacco use, hx cervical cancer (s/p cone procedure), CHF, lipoma on right cheek.    Other providers: Seeing Nima Yarbrough CNP (pulmonologist)    HTN: Controlled. Takes two Lisinopril-HCTZ 20mg -12.5 mg. Patient has not been recently checking home BP's. +occasional leg edema when warm outside. Denies chest pain, heart palpitations, changes in vision, headaches, syncope.    NIDDM: last A1c was 6.4 (6/11/2025). On 10 mg Farziga with co-morbid CKD. Stopped Metformin due to diarrhea, started Farxiga due to comorbid CKD. Checks daily blood sugars, usually in 130's. Last foot exam UTD 6/11/2025. Eye exam 11/25/24 with opthamologist. GFR 49 (1/2025). +Statin, +ACE. PNA UTD. Denies hypoglycemic incidents, polyphagia, polydipsia, polyuria.      CKD stage 3a: started on 10 mg Farziga. Last GFR 1/2025 improved to 49.     HLD: stable on 40 mg of Atorvastatin. Tolerating with no side effects. Last lipid 9/2024.         Review of Systems   Constitutional:  Negative for fatigue.   Eyes:  Negative for visual disturbance.   Respiratory:  Negative for shortness of breath.    Cardiovascular:  Positive for leg swelling (with warm water). Negative for chest pain and palpitations.   Neurological:  Negative for dizziness, syncope and light-headedness.       Objective   /86   Pulse 54   Ht 1.499 m (4' 11\")   Wt 81.6 kg (180 lb)   SpO2 92%   BMI 36.36 kg/m²     Physical Exam  Constitutional:       General: She is not in acute distress.     Appearance: Normal appearance.   Cardiovascular:      Rate and Rhythm: Normal rate and regular rhythm.      Pulses:           Dorsalis pedis pulses are 1+ on the right side and 1+ on the left side.      Heart sounds: No murmur heard.  Pulmonary:      Effort: Pulmonary effort is normal.      Breath sounds: Normal breath sounds. No wheezing, rhonchi or rales.   Musculoskeletal: "      Right lower leg: No edema.      Left lower leg: No edema.   Feet:      Right foot:      Protective Sensation: 5 sites tested.  5 sites sensed.      Skin integrity: Skin integrity normal.      Left foot:      Protective Sensation: 5 sites tested.  5 sites sensed.      Skin integrity: Skin integrity normal.   Skin:     General: Skin is warm and dry.      Findings: No rash.   Neurological:      Mental Status: She is alert.   Psychiatric:         Mood and Affect: Mood and affect normal.         Assessment/Plan   Diagnoses and all orders for this visit:  Type 2 diabetes mellitus with stage 3a chronic kidney disease, without long-term current use of insulin (Multi)  -     POCT glycosylated hemoglobin (Hb A1C) manually resulted  -     Basic metabolic panel; Future  -     Albumin-Creatinine Ratio, Urine Random; Future  Primary hypertension  Mixed hyperlipidemia  Chronic obstructive pulmonary disease, unspecified COPD type (Multi)  Follow-up 6 months for Medicare physical

## 2025-07-09 LAB
ALBUMIN/CREAT UR: 17 MG/G CREAT
ANION GAP SERPL CALCULATED.4IONS-SCNC: 10 MMOL/L (CALC) (ref 7–17)
BUN SERPL-MCNC: 25 MG/DL (ref 7–25)
BUN/CREAT SERPL: 19 (CALC) (ref 6–22)
CALCIUM SERPL-MCNC: 9.5 MG/DL (ref 8.6–10.4)
CHLORIDE SERPL-SCNC: 101 MMOL/L (ref 98–110)
CO2 SERPL-SCNC: 28 MMOL/L (ref 20–32)
CREAT SERPL-MCNC: 1.32 MG/DL (ref 0.5–1.05)
CREAT UR-MCNC: 47 MG/DL (ref 20–275)
EGFRCR SERPLBLD CKD-EPI 2021: 44 ML/MIN/1.73M2
GLUCOSE SERPL-MCNC: 125 MG/DL (ref 65–99)
MICROALBUMIN UR-MCNC: 0.8 MG/DL
POTASSIUM SERPL-SCNC: 3.9 MMOL/L (ref 3.5–5.3)
SODIUM SERPL-SCNC: 139 MMOL/L (ref 135–146)

## 2025-07-10 ENCOUNTER — TELEPHONE (OUTPATIENT)
Dept: PRIMARY CARE | Facility: CLINIC | Age: 70
End: 2025-07-10
Payer: MEDICARE

## 2025-07-21 ASSESSMENT — ENCOUNTER SYMPTOMS
JOINT SWELLING: 0
ABDOMINAL PAIN: 0
FEVER: 0
SORE THROAT: 0
NERVOUS/ANXIOUS: 0
TREMORS: 0
CHEST TIGHTNESS: 0
SINUS PRESSURE: 0
NAUSEA: 0
CHILLS: 0
TROUBLE SWALLOWING: 0
ROS GI COMMENTS: DENIES ACID REFLUX
SLEEP DISTURBANCE: 0
ACTIVITY CHANGE: 0
STRIDOR: 0
UNEXPECTED WEIGHT CHANGE: 0
HEADACHES: 0
SHORTNESS OF BREATH: 1
WEAKNESS: 0
WHEEZING: 0
VOMITING: 0
BACK PAIN: 0
PALPITATIONS: 0
APPETITE CHANGE: 0
AGITATION: 0
COUGH: 1
DIZZINESS: 0
ARTHRALGIAS: 0
BRUISES/BLEEDS EASILY: 0
SINUS PAIN: 0
DIARRHEA: 0
MYALGIAS: 0
RHINORRHEA: 0
FATIGUE: 0
EYE REDNESS: 0
EYE ITCHING: 0

## 2025-07-21 NOTE — PROGRESS NOTES
Patient: Leroy Dejesus    MRN: [id number]  : 1955 -- AGE: 69 y.o.    Provider: DON Chase     Location [facility]   Service Date: 2025       Department of Medicine  Division of Pulmonary, Critical Care, and Sleep Medicine       [facility]  Follow Up Visit Note    HISTORY OF PRESENT ILLNESS     PCP: Fabienne Styles PA-C   Pulm: Abby Mota CNP (Former)     HISTORY OF PRESENT ILLNESS   Leroy Dejesus is a 69 y.o. female who presents to a [facility] for a follow up visit with concerns of COPD.   I have independently interviewed and examined the patient in the office and reviewed available records.    DATE OF LAST VISIT: 2025    Current History  Since last visit on 3/21/25, patient completed previously ordered PFT testing. PFT from 25 showing a mixed obstructive/restrictive process with FEV1 at 38% and FVC at 43%. No BD response. TLC mildly reduced at 72%. DLCO was not able to be performed. FeNO normal at 7 ppb. 6MWT on 25 showed SpO2 stable on RA at rest at 92%. She did have desaturation down to 86% on RA; requiring 2L supplemental oxygen on exertion.    On today's visit, She reports the previously prescribed Trelegy 200 appears to be covered by insurance now. She continues to take that. Has not used the Breztri. Breathing has been stable. The heat/humidity makes things difficult; prefers to stay inside in the AC. Cough is fairly baseline for her. When she lays down; has more productive mucous; clear. No fever, sweats, chills. Intermittent rhinitis and sinus drainage. Denies GERD. She will use nebulizer typically 1-2x a day. Not sure if she's using the new DuoNebs or not. Denies wheezing, chest tightness.     CAT: 19    Prior Visits & History   25: Patient presents to pulmonary clinic today for new patient establishment; concern of COPD, hypoxia. Former pulmonary patient of  "Dr. Agustin and Abby Mota, CNP; transitioning care to .   On today's visit, the patient states she was diagnosed with COPD about 6 years ago. Currently takes Trelegy 200; takes daily. Has been on this for a few years. States it is too expensive. Wants something cheaper.   Will be referring her to  pharmacy team for assistance. Will also try switching her to Breztri. Feels like she needs albuterol maybe twice a week. Monitors her oxygen levels. States she will periodically sleep with oxygen on.   Will periodically wear oxygen in the mornings upon waking.     Currently, patient reports symptoms of:   []None  []SOB at Rest               [x]KULKARNI; only if really over exerting herself              [x]Cough: Productive and Clear/White Colored Mucous           []Wheezing               []Chest Tightness  []Orthopnea   []Lower Leg Edema   []Chest Pain  []Palpitations   []GERD   [x]Rhinitis; intermittent use of OTC \"nasal spray\" unknown what she takes  []Throat Clearing  []Voice Hoarseness    Prior Pulmonary History:   []None  []Born Premature  []Pulmonary Issues in Childhood  []Pulmonary Focused Hospitalizations  [x]Hx of Home Oxygen Use; has been using it for about 5 years    Prior/Current Inhaler History:   []None                             []ICS:        [x]JAYNE: Albuterol HFA and Albuterol Nebulizer   []ICS/LABA:       []JAYNE/MAVIS:      [x]ICS/LABA/LAMA: Trelegy Ellipta  []LABA:       []Other:   []LAMA:   []LABA/LAMA:     Sleep History:  []None     []Witnessed Apneic Events/Abnormal Breathing Patterns [x]Dozing Off Easily  []Completed a Sleep Study in the Past []Waking Up Choking/Gasping    [x]Daytime Napping  []Has Been Diagnosed with ERICA  []Morning Headaches     []Wears CPAP/BiPAP  []Snoring     [x]Excessive Daytime Fatigue     [x]Wears Nocturnal Oxygen  States she has no interest in seeing sleep medicine or doing a sleep study. Thinks she did a nocturnal oximetry study some years ago. Was told to wear oxygen at " night.    CAT Today: 26  ACT Today: 16  ESS Today: 5    Prior Notes & History   Prior pulmonary note from Abby Mota CNP (1/16/25)   Patient formerly under the care of Dr. Agustin, transitioned in Sept 2024 to seeing me. She has history of lung nodules: 8mm ground glass with current tobacco  use: annual Ct's recommended.   She has chronic hypoxic respiratory failure due to COPD, is oxygen dependent wearing oxygen 3L exertion 2L sleep and Trelegy 200 daily with prn albuterol.        Reports overall doing great not SOB, does have a wood burning stove to heat house and causes her to cough at night.  Keeps inhaler next to bed   Here without her oxygen today. Uses oxygen at night and in the morning, can do short tasks without it.    Oxygen not near wood burning fireplace.     REVIEW OF SYSTEMS     REVIEW OF SYSTEMS  Review of Systems   Constitutional:  Negative for activity change, appetite change, chills, fatigue, fever and unexpected weight change.   HENT:  Positive for postnasal drip and voice change. Negative for congestion, rhinorrhea, sinus pressure, sinus pain, sneezing, sore throat and trouble swallowing.         +throat clearing   Eyes:  Negative for redness and itching.   Respiratory:  Positive for cough and shortness of breath. Negative for chest tightness, wheezing and stridor.    Cardiovascular:  Negative for chest pain, palpitations and leg swelling.        Denies orthopnea   Gastrointestinal:  Negative for abdominal pain, diarrhea, nausea and vomiting.        Denies acid reflux   Musculoskeletal:  Negative for arthralgias, back pain, joint swelling and myalgias.   Skin:  Negative for rash.   Allergic/Immunologic: Negative for immunocompromised state.   Neurological:  Negative for dizziness, tremors, weakness and headaches.   Hematological:  Does not bruise/bleed easily.   Psychiatric/Behavioral:  Negative for agitation and sleep disturbance. The patient is not nervous/anxious.         Denies depression    All other systems reviewed and are negative.      ALLERGIES & MEDICATIONS     ALLERGIES  Allergies   Allergen Reactions    Adhesive Tape-Silicones Unknown    Latex, Natural Rubber Itching       MEDICATIONS  Current Outpatient Medications   Medication Sig Dispense Refill    albuterol 90 mcg/actuation inhaler INHALE TWO PUFFS BY MOUTH EVERY 6 HOURS AS NEEDED      atorvastatin (Lipitor) 40 mg tablet TAKE ONE TABLET BY MOUTH DAILY 90 tablet 1    dapagliflozin propanediol (Farxiga) 10 mg tablet TAKE ONE TABLET BY MOUTH ONCE DAILY 30 tablet 1    fluticasone-umeclidin-vilanter (TRELEGY-ELLIPTA) 200-62.5-25 mcg blister with device 1 puff Inhalation Once a day      ipratropium-albuteroL (Duo-Neb) 0.5-2.5 mg/3 mL nebulizer solution Take 3 mL by nebulization every 6 hours if needed for shortness of breath. 360 mL 5    lisinopriL-hydrochlorothiazide 20-12.5 mg tablet Take 2 tablets by mouth once daily. 180 tablet 1    potassium chloride CR 10 mEq ER tablet TAKE ONE TABLET BY MOUTH DAILY WITH FOOD 90 tablet 1    cetirizine (ZyrTEC) 10 mg tablet Take 1 tablet (10 mg) by mouth once daily. Take in the evening before bedtime 30 tablet 3     No current facility-administered medications for this visit.       PAST HISTORY     PAST MEDICAL HISTORY  She  has a past medical history of COPD (chronic obstructive pulmonary disease) (Multi), Hypertension, Ovarian cancer (Multi), and Personal history of malignant neoplasm of cervix uteri.    PAST SURGICAL HISTORY  Past Surgical History:   Procedure Laterality Date    BI US GUIDED BREAST LOCALIZATION AND BIOPSY LEFT Left 3/29/2019    BI US GUIDED BREAST LOCALIZATION AND BIOPSY LEFT [facility]    OTHER SURGICAL HISTORY  01/24/2022    Cervical conization    OTHER SURGICAL HISTORY  01/24/2022    Cervical biopsy       IMMUNIZATION HISTORY  Immunization History   Administered Date(s) Administered    COVID-19, mRNA, LNP-S, PF, 30 mcg/0.3 mL dose 04/03/2021, 04/21/2021, 10/21/2021     "Pneumococcal conjugate vaccine, 20-valent (PREVNAR 20) 09/26/2023    Pneumococcal polysaccharide vaccine, 23-valent, age 2 years and older (PNEUMOVAX 23) 07/27/2021    Tdap vaccine, age 7 year and older (BOOSTRIX, ADACEL) 07/27/2021    Zoster vaccine, recombinant, adult (SHINGRIX) 09/26/2023, 03/12/2024       SOCIAL HISTORY  She  reports that she has been smoking cigarettes. She started smoking about 45 years ago. She has a 45.2 pack-year smoking history. She has been exposed to tobacco smoke. She has never used smokeless tobacco. She reports that she does not currently use alcohol. She reports that she does not currently use drugs.   Has been at 0.75 ppd for the past year  Has tried patches (allergic to them) and other various smoking cessation techniques. Nothing works for her. States she has to just quit on her own.    OCCUPATIONAL/ENVIRONMENTAL HISTORY  Previously worked as: janitorial cleaning  Currently works as: retired  Exposure Hx:  [x]None  []Asbestos  []Silica  []Beryllium/Inhaled Metals  []Birds  []Exotic Animals  []Other  Has wood burning stove in the home      FAMILY HISTORY  Family History   Problem Relation Name Age of Onset    Lung cancer Mother Stephie casey     Cancer Mother Stephie casey     COPD Mother Stephie casey     COPD Father Nikolay casey     Lung disease Father Nikolay casey     Diabetes Brother      Brain cancer Other          Sisters    Diabetes Other          Sisters    Stroke Other          Sisters    Thyroid disease Other          Sisters       PHYSICAL EXAM     VITAL SIGNS: /66   Pulse 79   Ht (!) 1.499 m (4' 11\")   Wt 81.6 kg (180 lb)   SpO2 95%   BMI 36.36 kg/m²      PREVIOUS WEIGHTS:  Wt Readings from Last 3 Encounters:   07/25/25 81.6 kg (180 lb)   06/11/25 81.6 kg (180 lb)   03/21/25 79.4 kg (175 lb)       Physical Exam  Vitals reviewed.   Constitutional:       General: She is not in acute distress.     Appearance: Normal appearance. She is not ill-appearing or " toxic-appearing.   HENT:      Head: Normocephalic.      Nose: No rhinorrhea.     Cardiovascular:      Rate and Rhythm: Normal rate and regular rhythm.      Heart sounds: Normal heart sounds.   Pulmonary:      Effort: Pulmonary effort is normal. No respiratory distress.      Breath sounds: Normal breath sounds. No stridor. No wheezing, rhonchi or rales.      Comments: Overall diminished lung sounds all over  Abdominal:      General: Abdomen is flat.     Musculoskeletal:         General: Normal range of motion.      Right lower leg: No edema.      Left lower leg: No edema.     Skin:     General: Skin is warm and dry.      Nails: There is no clubbing.     Neurological:      General: No focal deficit present.      Mental Status: She is alert and oriented to person, place, and time.     Psychiatric:         Mood and Affect: Mood normal.         Behavior: Behavior normal.         Judgment: Judgment normal.         RESULTS/DATA     Pulmonary Function Test Results   PFT   25: FEV1/FVC: 70, FEV1: 0.68 (38%), FVC: 0.97 (43%), no BD response, ZTH78-82: 34% (16% change w/ BD admin), TLC: 3.03 (72%), RV/T%, DLCO: not performed      FeNO   25: 7 ppb      6MWT   25: 244 m (264 m LLN). SpO2 92% on RA at rest. Desat to 86% w/ ambulation. Required 2L oxygen with ambulation     Chest Radiograph     No results found for this or any previous visit from the past 365 days.      Chest CT Scan   Chest CT   24: IMPRESSION: 1. 8 mm subpleural ground-glass nodule right lower lobe is unchanged. Consider follow up non contrast chest CT every 2 years until 5 years.      Lung CA Screening Chest CT   3/4/24: IMPRESSION: 1. There is a 7 mm right lower lobe ground-glass/nodular density. While this may be post inflammatory/benign in nature, a six-month low-dose chest CT is recommended for surveillance. 2. Severe coronary artery calcifications and correlate with coronary artery disease risk factors. LUNG RADS CATEGORY: Lung Rad:  "Lung-RADS 3     Echocardiogram & Cardiac Studies   Echo   3/14/18: Mild concentric Left ventricle hypertrophy.   There is normal left ventricular systolic function.   Estimated ejection fraction is 55-59%.   The right ventricular cavity size is moderate to severely dilated.   The right ventricular global systolic function is moderately reduced.   The left atrial size is mildly dilated.   Right atrial cavity size is moderately dilated.   A small pericardial effusion is visualized.   There is no evidence of cardiac tamponade.   Mild mitral regurgitation.   Mild tricuspid regurgitation.   There is evidence of mild pulmonary hypertension.        Labwork & Pathology   Complete Blood Count  Lab Results   Component Value Date    WBC 12.1 (H) 08/08/2022    HGB 12.6 08/08/2022    HCT 39.5 08/08/2022    MCV 94 08/08/2022     08/08/2022     Peripheral Eosinophil Count:   Eosinophils Absolute (K/UL)   Date Value   03/13/2018 0.10     Immunocap IgE  No results found for: \"ICIGE\"    Bronchoscopy & Pathology/Cultures       ASSESSMENT/PLAN     Ms. Dejesus is a 69 y.o. female; was referred to the [facility] for evaluation of Follow-up and COPD    Problem List and Orders  Diagnoses and all orders for this visit:  Chronic obstructive pulmonary disease, unspecified COPD type (Multi)  -     Referral to Clinical Pharmacy; Future  -     Referral to Pulmonary Rehabilitation; Future  Restrictive lung disease  -     Referral to Pulmonary Rehabilitation; Future  Chronic respiratory failure with hypoxia  -     Referral to Pulmonary Rehabilitation; Future  Nicotine dependence, cigarettes, uncomplicated  Lung nodule  Post-nasal drainage  -     cetirizine (ZyrTEC) 10 mg tablet; Take 1 tablet (10 mg) by mouth once daily. Take in the evening before bedtime      Assessment and Plan / Recommendations:  Mixed COPD w/ Restrictive Lung Disease: States she was diagnosed about 6 years ago.  No PFT on record that I " can find from her prior pulmonary office.  Has been on Trelegy 200 for the past few years.  States that it is very expensive and would like to switch to something different.  Denies any shortness of breath at rest but will typically have increased dyspnea on more exertional activity.  She does have a chronic cough which typically produces a clear-colored mucus.  Denies any wheezing. PFT from 6/9/25 showing a mixed obstructive/restrictive process with FEV1 at 38% and FVC at 43%. No BD response. TLC mildly reduced at 72%. DLCO was not able to be performed. FeNO normal at 7 ppb.   -Continue Breztri; 2 puffs BID  -Prior referral to  clinical pharmacy team to help with inhaler coverage  -Continue albuterol HFA as needed  -Continue DuoNebs as needed  -There is no clear sign of any ILD involvement on Chest CT from 9/9/24  -Restriction could be BMI related  -Referral to pulmonary rehab    2. Chronic Hypoxic Respiratory Failure: States she has been given oxygen as of 5 years ago.  Typically wears 2 L on exertion but will sometimes wear periodically throughout the day.  She will also periodically wear at night.  Believes she did a nocturnal oximetry study with her former pulmonologist; nothing on record. 6MWT on 6/9/25 showed SpO2 stable on RA at rest at 92%. She did have desaturation down to 86% on RA; requiring 2L supplemental oxygen on exertion.  -Requires 2L on exertion/PRN  -Previously ordered nocturnal oximetry study on room air to determine nocturnal needs.   -On 7/25/25 visit; states she completed nocturnal study and sent it back to [facility] ([facility]). I have not gotten results from them. Office will need to contact DME and request they send the results over.  -Continue to wear supplemental oxygen in order to maintain SpO2 levels greater than 88 to 90%    3. Nicotine Dependence & Lung Nodule: current cigarette smoker; averaging 1 ppd since age 24. 45 total pack years. Currently down to 0.75 ppd. LCS CT from 3/4/24 showing  7 mm RLL GGO. Repeat Chest CT 9/9/24 shows stable RLL GGO.  -Previously ordered annual lung cancer screening chest CT; due 9/10/25. Needs scheduled.  - >3 minutes spent on smoking cessation  -She has attempted nicotine patches (had allergic reaction), and other various smoking cessation modalities but nothing has ever seem to work for her.  She feels like she just needs to make up her mind to quit on her own.    4. PND: reports worsening cough when laying down, productive cough upon waking and intermittent throat clearing  -Start cetirizine 10 mg daily    RTC 6 months    Nima Yarbrough, CNP  [facility] Nurse Practitioner    *This note was dictated using DRAGON speech recognition software and was corrected for spelling or grammatical errors, but despite proofreading several typographical errors might be present that might affect the meaning of the content.*

## 2025-07-25 ENCOUNTER — APPOINTMENT (OUTPATIENT)
Dept: PULMONOLOGY | Facility: CLINIC | Age: 70
End: 2025-07-25
Payer: MEDICARE

## 2025-07-25 VITALS
DIASTOLIC BLOOD PRESSURE: 66 MMHG | SYSTOLIC BLOOD PRESSURE: 118 MMHG | HEART RATE: 79 BPM | BODY MASS INDEX: 36.29 KG/M2 | WEIGHT: 180 LBS | OXYGEN SATURATION: 95 % | HEIGHT: 59 IN

## 2025-07-25 DIAGNOSIS — R91.1 LUNG NODULE: ICD-10-CM

## 2025-07-25 DIAGNOSIS — J44.9 CHRONIC OBSTRUCTIVE PULMONARY DISEASE, UNSPECIFIED COPD TYPE (MULTI): Primary | ICD-10-CM

## 2025-07-25 DIAGNOSIS — R09.82 POST-NASAL DRAINAGE: ICD-10-CM

## 2025-07-25 DIAGNOSIS — F17.210 NICOTINE DEPENDENCE, CIGARETTES, UNCOMPLICATED: ICD-10-CM

## 2025-07-25 DIAGNOSIS — J98.4 RESTRICTIVE LUNG DISEASE: ICD-10-CM

## 2025-07-25 DIAGNOSIS — J96.11 CHRONIC RESPIRATORY FAILURE WITH HYPOXIA: ICD-10-CM

## 2025-07-25 PROCEDURE — 1126F AMNT PAIN NOTED NONE PRSNT: CPT | Performed by: NURSE PRACTITIONER

## 2025-07-25 PROCEDURE — 3078F DIAST BP <80 MM HG: CPT | Performed by: NURSE PRACTITIONER

## 2025-07-25 PROCEDURE — 99214 OFFICE O/P EST MOD 30 MIN: CPT | Performed by: NURSE PRACTITIONER

## 2025-07-25 PROCEDURE — 3044F HG A1C LEVEL LT 7.0%: CPT | Performed by: NURSE PRACTITIONER

## 2025-07-25 PROCEDURE — 1159F MED LIST DOCD IN RCRD: CPT | Performed by: NURSE PRACTITIONER

## 2025-07-25 PROCEDURE — 3074F SYST BP LT 130 MM HG: CPT | Performed by: NURSE PRACTITIONER

## 2025-07-25 PROCEDURE — 3008F BODY MASS INDEX DOCD: CPT | Performed by: NURSE PRACTITIONER

## 2025-07-25 PROCEDURE — 1160F RVW MEDS BY RX/DR IN RCRD: CPT | Performed by: NURSE PRACTITIONER

## 2025-07-25 RX ORDER — CETIRIZINE HYDROCHLORIDE 10 MG/1
10 TABLET ORAL DAILY
Qty: 30 TABLET | Refills: 3 | Status: SHIPPED | OUTPATIENT
Start: 2025-07-25

## 2025-07-25 ASSESSMENT — PAIN SCALES - GENERAL: PAINLEVEL_OUTOF10: 0-NO PAIN

## 2025-07-25 ASSESSMENT — ENCOUNTER SYMPTOMS: VOICE CHANGE: 1

## 2025-07-25 NOTE — PATIENT INSTRUCTIONS
"Today we discussed your test results and plan of care moving forward.    -Referral to pulmonary rehab; they will call you  -Start cetirizine (Zyrtec) 10 mg daily in the evening. This is an antihistamine to help dry up runny nose/post nasal drainage symptoms. This medication can also be found over the counter if needed.  -Ordering annual lung cancer screening chest CT; due 9/10/2025.  You will need to cancel the previously ordered/scheduled CT that your former pulmonologist ordered for you.  -Continue Trelegy Ellipta 200 (fluticasone + umeclidinium + vilanterol); 1 inhalation once a day. Rinse mouth after use to avoid thrush. This is your long acting daily maintenance inhaler. Please contact my nurse if you have any difficulty getting this prescription.  -Referral to  clinical pharmacy team to help with inhaler coverage  -Continue Albuterol Inhaler; 2 puffs every 4-6 hours as needed for shortness of breath. You can also take this 10-15 minutes prior to exertional activity to help \"prime\" your lungs.  -Continue DuoNeb (albuterol + ipratropium) nebulizer treatments; 1 unit dose every 6 hours as needed for shortness of breath/cough. Can also use these treatments daily/scheduled if you find them helpful.  -I will have to double check with [facility] about your night time oxygen study  -Continue to monitor your oxygen levels closely.  Goal is to remain above 88 to 90%.  -Cigarette/nicotine use is harming your health; and specifically exacerbating the risk of primary lung cancer. Smoking causes 85-90% of all lung cancers.  I encourage your current efforts to stop and recommended that you stop smoking entirely. I recommended community support groups, and use local tobacco cessation hotlines 1-800-Quit-Now (1-600.658.9439). If you are ready to quit, I advise you letting either your primary care provider or myself know so we can discuss smoking cessation techniques/treatments. *E-cigarettes should not replace smoking or be used " to help quit smoking.    Thank you for visiting the pulmonary clinic today! It was a pleasure to participate in your care.  Please return to clinic 6 months or sooner if needed.    Nima Yarbrough, CNP  My Office Number: [phone]   CT Scheduling: [phone]  PFT (Pulmonary Function Test) Scheduling: [phone]  Follow Up Visit Scheduling: [phone]  My Nurse: Radha Henley RN & Wendy Laboy RN    To reach the nurse, Radha Henley RN, please call ([phone]). If unable to reach Radha, please contact Wendy Laboy RN at ([phone]). Both nurses have secure voicemail's if needing to leave a message.    **For urgent needs such as medication issues/refills, active sick symptoms or medical concerns please call the office directly and speak to the pulmonary nurse. For nonurgent messages please use [facility]. Thank you.**

## 2025-08-02 DIAGNOSIS — E11.22 TYPE 2 DIABETES MELLITUS WITH STAGE 3B CHRONIC KIDNEY DISEASE, WITHOUT LONG-TERM CURRENT USE OF INSULIN (MULTI): ICD-10-CM

## 2025-08-02 DIAGNOSIS — N18.32 TYPE 2 DIABETES MELLITUS WITH STAGE 3B CHRONIC KIDNEY DISEASE, WITHOUT LONG-TERM CURRENT USE OF INSULIN (MULTI): ICD-10-CM

## 2025-08-04 RX ORDER — DAPAGLIFLOZIN 10 MG/1
10 TABLET, FILM COATED ORAL DAILY
Qty: 90 TABLET | Refills: 1 | Status: SHIPPED | OUTPATIENT
Start: 2025-08-04

## 2025-08-12 ENCOUNTER — TELEPHONE (OUTPATIENT)
Dept: PULMONOLOGY | Facility: HOSPITAL | Age: 70
End: 2025-08-12
Payer: MEDICARE

## 2025-09-01 DIAGNOSIS — I10 PRIMARY HYPERTENSION: ICD-10-CM

## 2025-09-02 RX ORDER — LISINOPRIL AND HYDROCHLOROTHIAZIDE 12.5; 2 MG/1; MG/1
2 TABLET ORAL DAILY
Qty: 180 TABLET | Refills: 1 | Status: SHIPPED | OUTPATIENT
Start: 2025-09-02

## 2025-09-10 ENCOUNTER — APPOINTMENT (OUTPATIENT)
Dept: CARDIAC REHAB | Facility: CLINIC | Age: 70
End: 2025-09-10
Payer: MEDICARE

## 2026-01-23 ENCOUNTER — APPOINTMENT (OUTPATIENT)
Dept: PULMONOLOGY | Facility: CLINIC | Age: 71
End: 2026-01-23
Payer: MEDICARE